# Patient Record
Sex: FEMALE | Race: WHITE | NOT HISPANIC OR LATINO | URBAN - NONMETROPOLITAN AREA
[De-identification: names, ages, dates, MRNs, and addresses within clinical notes are randomized per-mention and may not be internally consistent; named-entity substitution may affect disease eponyms.]

---

## 2023-07-17 ENCOUNTER — TELEPHONE (OUTPATIENT)
Dept: PRIMARY CARE | Facility: CLINIC | Age: 58
End: 2023-07-17

## 2023-07-17 NOTE — TELEPHONE ENCOUNTER
FYI- Patient calling for same day appointment for lump on the inside of vagina. Patient states she does not have an obgyn to see about this matter. Patient doesn't want to wait because she is having some pain.   I apologized that we have no appointments available in the next 48 hours. I encouraged patient should seek evaluation and treatment at the nearest Urgent Care/[facility] or ER depending on symptoms.   Patient aware that a message would be sent to provider to inform them and document conversation.

## 2023-09-26 PROBLEM — E78.1 HYPERTRIGLYCERIDEMIA: Status: ACTIVE | Noted: 2023-09-26

## 2023-09-26 PROBLEM — E78.5 HYPERLIPIDEMIA: Status: ACTIVE | Noted: 2023-09-26

## 2023-09-26 PROBLEM — R00.1 SINUS BRADYCARDIA: Status: ACTIVE | Noted: 2023-09-26

## 2023-09-26 PROBLEM — M67.52 SYNOVIAL PLICA SYNDROME OF LEFT KNEE: Status: ACTIVE | Noted: 2023-09-26

## 2023-09-26 PROBLEM — K44.9 HIATAL HERNIA: Status: ACTIVE | Noted: 2023-09-26

## 2023-09-26 PROBLEM — E06.3 HYPOTHYROIDISM DUE TO HASHIMOTO'S THYROIDITIS: Status: ACTIVE | Noted: 2023-09-26

## 2023-09-26 PROBLEM — E03.8 HYPOTHYROIDISM DUE TO HASHIMOTO'S THYROIDITIS: Status: ACTIVE | Noted: 2023-09-26

## 2023-09-26 RX ORDER — ALBUTEROL SULFATE 90 UG/1
AEROSOL, METERED RESPIRATORY (INHALATION)
COMMUNITY
Start: 2016-03-10 | End: 2024-01-24 | Stop reason: ALTCHOICE

## 2023-09-26 RX ORDER — GUAIFENESIN 600 MG/1
600 TABLET, EXTENDED RELEASE ORAL
COMMUNITY
Start: 2016-03-10 | End: 2024-01-24 | Stop reason: ALTCHOICE

## 2023-09-26 RX ORDER — LEVOTHYROXINE SODIUM 112 UG/1
TABLET ORAL
COMMUNITY
Start: 2019-10-15 | End: 2023-09-27 | Stop reason: SDUPTHER

## 2023-09-26 NOTE — PROGRESS NOTES
"Subjective   Patient ID: Apryl Vinson is a 58 y.o. female who presents for Annual Exam.    HPI     The patient presents for her annual wellness exam.  Last pap/cervical cancer screening: 10/20/22 NILM HPV neg   Last mammogram: 9/27/22 neg ; no family hx   Hx of colonoscopy: 2017   Menopause: 2020  History of depression or anxiety: no   Immunizations: shingrix #2 - declines today - had reaction to first shot   Diet: Follows a healthy diet- lots of fruit, veggies, protein; limiting carbs, less sugary drinks   Exercise: Gets some exercise   Working on weight loss      Hypothyroidism- on levothyroxine 112 mcg daily, ran out of med 1 week ago     HPL- diet controlled     No prior meds      Left posterior neck mass of unknown duration   Her mother and daughter have family hx hodgkin's lymphoma  Her brother had non hodgkin's lymphoma (b-cell)   Feels well, denies night sweats/weight loss     Concern for sinus infection on right side, 2 weeks of nasal congestion, teeth pain, radiates to ear   No fever  Improving some with OTC meds but not gone     Review of Systems   Constitutional:  Negative for chills, fatigue and fever.   HENT:  Negative for congestion, ear pain and sore throat.    Eyes:  Negative for visual disturbance.   Respiratory:  Negative for cough and shortness of breath.    Cardiovascular:  Negative for chest pain, palpitations and leg swelling.   Gastrointestinal:  Negative for abdominal pain, constipation, diarrhea, nausea and vomiting.   Genitourinary: Negative.    Musculoskeletal: Negative.    Skin:  Negative for rash.   Neurological:  Negative for dizziness, weakness, numbness and headaches.   Psychiatric/Behavioral: Negative.         Objective   /71 (BP Location: Left arm, Patient Position: Sitting, BP Cuff Size: Adult)   Pulse 63   Temp 36.6 °C (97.9 °F) (Temporal)   Resp 16   Ht 1.734 m (5' 8.25\")   Wt 105 kg (230 lb 9.6 oz)   LMP  (Exact Date)   SpO2 97%   BMI 34.81 kg/m² "     Physical Exam    Constitutional: Well developed, well nourished, alert and in no acute distress.  Head and Face: Normocephalic, atraumatic.  Eyes: Normal external exam. Pupils equally round and reactive to light with normal accommodation and extraocular movements intact.   ENT: External inspection of ears normal, tympanic membranes visualized and normal. Nasal mucosa, septum, and turbinates normal. Oral mucosa moist, oropharynx clear.   Neck: Supple.  ~2x3 cm left posterior inferior cervical neck mass with difficult to palpate borders, nontender. Thyroid not enlarged, no palpable nodules.   Cardiovascular: Regular rate and rhythm, normal S1 and S2, no murmurs, gallops, or rubs. Radial pulses normal. No peripheral edema. No carotid bruits.   Pulmonary: No respiratory distress, lungs clear to auscultation bilaterally. No wheezes, rhonchi, rales.   Abdomen: Soft, nontender, nondistended, normal bowel sounds. No masses palpated.   Musculoskeletal: Gait normal. Muscle strength/tone normal of all 4 extremities. Normal range of motion of all extremities.   Skin: Warm, well perfused, normal skin turgor and color, no lesions or rashes noted.   Neurologic: Cranial nerves II-XII grossly intact.  Sensation normal bilaterally.   Psychiatric: Mood calm and affect normal.    Assessment/Plan   Problem List Items Addressed This Visit             ICD-10-CM    Hyperlipidemia E78.5     Dietary changes discussed   CT calcium score          Relevant Orders    Lipid Panel    Hypothyroidism due to Hashimoto's thyroiditis E03.8, E06.3     Continue levothyroxine, check TSH          Relevant Medications    levothyroxine (Synthroid, Levoxyl) 112 mcg tablet    Other Relevant Orders    TSH with reflex to Free T4 if abnormal    Class 1 obesity without serious comorbidity with body mass index (BMI) of 34.0 to 34.9 in adult E66.9, Z68.34     Other Visit Diagnoses         Codes    Annual physical exam    -  Primary Z00.00    Relevant Orders     CBC and Auto Differential    Comprehensive Metabolic Panel    Hemoglobin A1C    Screening mammogram, encounter for     Z12.31    Relevant Orders    BI mammo bilateral screening tomosynthesis    Encounter for screening for cardiovascular disorders     Z13.6    Relevant Orders    CT cardiac scoring wo IV contrast    Neck mass     R22.1    Relevant Orders    CT soft tissue neck w IV contrast    Acute non-recurrent maxillary sinusitis     J01.00    Relevant Medications    amoxicillin (Amoxil) 875 mg tablet          Mackenzie Cote, DO.  9/27/2023

## 2023-09-27 ENCOUNTER — OFFICE VISIT (OUTPATIENT)
Dept: PRIMARY CARE | Facility: CLINIC | Age: 58
End: 2023-09-27
Payer: COMMERCIAL

## 2023-09-27 VITALS
RESPIRATION RATE: 16 BRPM | SYSTOLIC BLOOD PRESSURE: 123 MMHG | TEMPERATURE: 97.9 F | HEART RATE: 63 BPM | WEIGHT: 230.6 LBS | OXYGEN SATURATION: 97 % | HEIGHT: 68 IN | DIASTOLIC BLOOD PRESSURE: 71 MMHG | BODY MASS INDEX: 34.95 KG/M2

## 2023-09-27 DIAGNOSIS — E78.5 HYPERLIPIDEMIA, UNSPECIFIED HYPERLIPIDEMIA TYPE: ICD-10-CM

## 2023-09-27 DIAGNOSIS — Z12.31 SCREENING MAMMOGRAM, ENCOUNTER FOR: ICD-10-CM

## 2023-09-27 DIAGNOSIS — E03.8 HYPOTHYROIDISM DUE TO HASHIMOTO'S THYROIDITIS: ICD-10-CM

## 2023-09-27 DIAGNOSIS — E66.9 CLASS 1 OBESITY WITHOUT SERIOUS COMORBIDITY WITH BODY MASS INDEX (BMI) OF 34.0 TO 34.9 IN ADULT, UNSPECIFIED OBESITY TYPE: ICD-10-CM

## 2023-09-27 DIAGNOSIS — E06.3 HYPOTHYROIDISM DUE TO HASHIMOTO'S THYROIDITIS: ICD-10-CM

## 2023-09-27 DIAGNOSIS — R22.1 NECK MASS: ICD-10-CM

## 2023-09-27 DIAGNOSIS — Z13.6 ENCOUNTER FOR SCREENING FOR CARDIOVASCULAR DISORDERS: ICD-10-CM

## 2023-09-27 DIAGNOSIS — Z00.00 ANNUAL PHYSICAL EXAM: Primary | ICD-10-CM

## 2023-09-27 DIAGNOSIS — J01.00 ACUTE NON-RECURRENT MAXILLARY SINUSITIS: ICD-10-CM

## 2023-09-27 PROBLEM — E66.811 CLASS 1 OBESITY WITHOUT SERIOUS COMORBIDITY WITH BODY MASS INDEX (BMI) OF 34.0 TO 34.9 IN ADULT: Status: ACTIVE | Noted: 2023-09-27

## 2023-09-27 PROCEDURE — 3008F BODY MASS INDEX DOCD: CPT | Performed by: FAMILY MEDICINE

## 2023-09-27 PROCEDURE — 1036F TOBACCO NON-USER: CPT | Performed by: FAMILY MEDICINE

## 2023-09-27 PROCEDURE — 99396 PREV VISIT EST AGE 40-64: CPT | Performed by: FAMILY MEDICINE

## 2023-09-27 RX ORDER — AMOXICILLIN 875 MG/1
875 TABLET, FILM COATED ORAL 2 TIMES DAILY
Qty: 14 TABLET | Refills: 0 | Status: SHIPPED | OUTPATIENT
Start: 2023-09-27 | End: 2023-10-04

## 2023-09-27 RX ORDER — LEVOTHYROXINE SODIUM 112 UG/1
112 TABLET ORAL
Qty: 90 TABLET | Refills: 3 | Status: SHIPPED | OUTPATIENT
Start: 2023-09-27 | End: 2024-09-26

## 2023-09-27 ASSESSMENT — ENCOUNTER SYMPTOMS
ABDOMINAL PAIN: 0
DIZZINESS: 0
DIARRHEA: 0
MUSCULOSKELETAL NEGATIVE: 1
FATIGUE: 0
WEAKNESS: 0
PALPITATIONS: 0
NUMBNESS: 0
SHORTNESS OF BREATH: 0
CONSTIPATION: 0
PSYCHIATRIC NEGATIVE: 1
CHILLS: 0
VOMITING: 0
HEADACHES: 0
SORE THROAT: 0
NAUSEA: 0
COUGH: 0
FEVER: 0

## 2023-09-27 ASSESSMENT — PATIENT HEALTH QUESTIONNAIRE - PHQ9
1. LITTLE INTEREST OR PLEASURE IN DOING THINGS: NOT AT ALL
SUM OF ALL RESPONSES TO PHQ9 QUESTIONS 1 AND 2: 0
2. FEELING DOWN, DEPRESSED OR HOPELESS: NOT AT ALL

## 2023-09-27 NOTE — PATIENT INSTRUCTIONS
Recommendations for women annual wellness exam:   Make sure screenings for cervical, breast, and colon cancer are up to date if applicable- pap smears age 21-65, discuss mammogram starting at age 40, colonoscopy at age 45, earlier if positive family history for breast or colon cancer   Screen for osteoporosis with DXA bone scan starting at age 65 or sooner if risk factors present (long term steroid use, smoking, heavy alcohol use, history of fracture, rheumatoid arthritis, low body weight, family history of hip fracture)  Screening for lung cancer with low-dose CT in all adults age 50-80 who have a 20 pack-year smoking history and currently smoke or have quit within the past 15 years  Follow a healthy diet (Dash diet, Mediterranean diet)  Exercise 150 min/wk   Maintain healthy weight (BMI < 25)   Do not smoke   Alcohol in moderation (up to 1 drink/day)  Get enough sleep (7-8 hours/night)  Make sure immunizations are up to date (influenza, Tdap, shingles if age > 50)  Postmenopausal women or women with osteoporosis need minimum 1,200 mg calcium and 800 IU vitamin D daily  Talk to your physician if you have concerns about depression or anxiety  Visit dentist twice yearly

## 2023-10-12 ENCOUNTER — LAB (OUTPATIENT)
Dept: LAB | Facility: LAB | Age: 58
End: 2023-10-12
Payer: COMMERCIAL

## 2023-10-12 DIAGNOSIS — Z00.00 ANNUAL PHYSICAL EXAM: ICD-10-CM

## 2023-10-12 DIAGNOSIS — E78.5 HYPERLIPIDEMIA, UNSPECIFIED HYPERLIPIDEMIA TYPE: ICD-10-CM

## 2023-10-12 DIAGNOSIS — E06.3 HYPOTHYROIDISM DUE TO HASHIMOTO'S THYROIDITIS: ICD-10-CM

## 2023-10-12 DIAGNOSIS — E03.8 HYPOTHYROIDISM DUE TO HASHIMOTO'S THYROIDITIS: ICD-10-CM

## 2023-10-12 LAB
ALBUMIN SERPL BCP-MCNC: 4.7 G/DL (ref 3.4–5)
ALP SERPL-CCNC: 95 U/L (ref 33–110)
ALT SERPL W P-5'-P-CCNC: 14 U/L (ref 7–45)
ANION GAP SERPL CALC-SCNC: 12 MMOL/L (ref 10–20)
AST SERPL W P-5'-P-CCNC: 15 U/L (ref 9–39)
BASOPHILS # BLD AUTO: 0.06 X10*3/UL (ref 0–0.1)
BASOPHILS NFR BLD AUTO: 0.9 %
BILIRUB SERPL-MCNC: 0.4 MG/DL (ref 0–1.2)
BUN SERPL-MCNC: 16 MG/DL (ref 6–23)
CALCIUM SERPL-MCNC: 10.3 MG/DL (ref 8.6–10.6)
CHLORIDE SERPL-SCNC: 103 MMOL/L (ref 98–107)
CHOLEST SERPL-MCNC: 208 MG/DL (ref 0–199)
CHOLESTEROL/HDL RATIO: 3.4
CO2 SERPL-SCNC: 30 MMOL/L (ref 21–32)
CREAT SERPL-MCNC: 0.75 MG/DL (ref 0.5–1.05)
EOSINOPHIL # BLD AUTO: 0.39 X10*3/UL (ref 0–0.7)
EOSINOPHIL NFR BLD AUTO: 5.8 %
ERYTHROCYTE [DISTWIDTH] IN BLOOD BY AUTOMATED COUNT: 13.3 % (ref 11.5–14.5)
EST. AVERAGE GLUCOSE BLD GHB EST-MCNC: 114 MG/DL
GFR SERPL CREATININE-BSD FRML MDRD: >90 ML/MIN/1.73M*2
GLUCOSE SERPL-MCNC: 88 MG/DL (ref 74–99)
HBA1C MFR BLD: 5.6 %
HCT VFR BLD AUTO: 43.5 % (ref 36–46)
HDLC SERPL-MCNC: 61.2 MG/DL
HGB BLD-MCNC: 14 G/DL (ref 12–16)
IMM GRANULOCYTES # BLD AUTO: 0.02 X10*3/UL (ref 0–0.7)
IMM GRANULOCYTES NFR BLD AUTO: 0.3 % (ref 0–0.9)
LDLC SERPL CALC-MCNC: 113 MG/DL
LYMPHOCYTES # BLD AUTO: 2.26 X10*3/UL (ref 1.2–4.8)
LYMPHOCYTES NFR BLD AUTO: 33.9 %
MCH RBC QN AUTO: 28.9 PG (ref 26–34)
MCHC RBC AUTO-ENTMCNC: 32.2 G/DL (ref 32–36)
MCV RBC AUTO: 90 FL (ref 80–100)
MONOCYTES # BLD AUTO: 0.62 X10*3/UL (ref 0.1–1)
MONOCYTES NFR BLD AUTO: 9.3 %
NEUTROPHILS # BLD AUTO: 3.32 X10*3/UL (ref 1.2–7.7)
NEUTROPHILS NFR BLD AUTO: 49.8 %
NON HDL CHOLESTEROL: 147 MG/DL (ref 0–149)
NRBC BLD-RTO: 0 /100 WBCS (ref 0–0)
PLATELET # BLD AUTO: 463 X10*3/UL (ref 150–450)
PMV BLD AUTO: 9 FL (ref 7.5–11.5)
POTASSIUM SERPL-SCNC: 4.8 MMOL/L (ref 3.5–5.3)
PROT SERPL-MCNC: 8 G/DL (ref 6.4–8.2)
RBC # BLD AUTO: 4.85 X10*6/UL (ref 4–5.2)
SODIUM SERPL-SCNC: 140 MMOL/L (ref 136–145)
TRIGL SERPL-MCNC: 168 MG/DL (ref 0–149)
TSH SERPL-ACNC: 3.2 MIU/L (ref 0.44–3.98)
VLDL: 34 MG/DL (ref 0–40)
WBC # BLD AUTO: 6.7 X10*3/UL (ref 4.4–11.3)

## 2023-10-12 PROCEDURE — 84443 ASSAY THYROID STIM HORMONE: CPT

## 2023-10-12 PROCEDURE — 83036 HEMOGLOBIN GLYCOSYLATED A1C: CPT

## 2023-10-12 PROCEDURE — 80053 COMPREHEN METABOLIC PANEL: CPT

## 2023-10-12 PROCEDURE — 80061 LIPID PANEL: CPT

## 2023-10-12 PROCEDURE — 85025 COMPLETE CBC W/AUTO DIFF WBC: CPT

## 2023-10-12 PROCEDURE — 36415 COLL VENOUS BLD VENIPUNCTURE: CPT

## 2023-10-19 ENCOUNTER — HOSPITAL ENCOUNTER (OUTPATIENT)
Dept: RADIOLOGY | Facility: CLINIC | Age: 58
Discharge: HOME | End: 2023-10-19
Payer: COMMERCIAL

## 2023-10-19 ENCOUNTER — TELEPHONE (OUTPATIENT)
Dept: PRIMARY CARE | Facility: CLINIC | Age: 58
End: 2023-10-19

## 2023-10-19 ENCOUNTER — APPOINTMENT (OUTPATIENT)
Dept: RADIOLOGY | Facility: CLINIC | Age: 58
End: 2023-10-19
Payer: COMMERCIAL

## 2023-10-19 DIAGNOSIS — Z12.31 SCREENING MAMMOGRAM, ENCOUNTER FOR: ICD-10-CM

## 2023-10-19 PROCEDURE — 77063 BREAST TOMOSYNTHESIS BI: CPT

## 2023-10-19 PROCEDURE — 77067 SCR MAMMO BI INCL CAD: CPT | Mod: BILATERAL PROCEDURE | Performed by: RADIOLOGY

## 2023-10-19 PROCEDURE — 77063 BREAST TOMOSYNTHESIS BI: CPT | Mod: BILATERAL PROCEDURE | Performed by: RADIOLOGY

## 2023-10-19 NOTE — TELEPHONE ENCOUNTER
Patient calling regarding CT head/neck    Insurance has not yet approved this, she was scheduled for today but they cancelled due to this    She was under the impression this was going to be ordered as STAT    She wants to know if there is anything our office can do?  It is now worse and on the other side as well at this point

## 2023-10-19 NOTE — TELEPHONE ENCOUNTER
Patient is aware FYI.  Per patient, they admitted this was never even started until today but it was approved.

## 2023-10-19 NOTE — TELEPHONE ENCOUNTER
Per [facility] de[facility], patient is scheduled 10/26.  They are hoping this will be authorized by then.  If it is approved before then, they will schedule patient sooner.

## 2023-10-26 ENCOUNTER — ANCILLARY PROCEDURE (OUTPATIENT)
Dept: RADIOLOGY | Facility: CLINIC | Age: 58
End: 2023-10-26
Payer: COMMERCIAL

## 2023-10-26 DIAGNOSIS — J34.1 MUCOCELE OF MAXILLARY SINUS: Primary | ICD-10-CM

## 2023-10-26 DIAGNOSIS — R22.1 NECK MASS: ICD-10-CM

## 2023-10-26 PROBLEM — M47.812 CERVICAL SPONDYLOSIS: Status: ACTIVE | Noted: 2023-10-26

## 2023-10-26 PROCEDURE — 70491 CT SOFT TISSUE NECK W/DYE: CPT | Performed by: RADIOLOGY

## 2023-10-26 PROCEDURE — 70491 CT SOFT TISSUE NECK W/DYE: CPT

## 2023-10-26 PROCEDURE — 2550000001 HC RX 255 CONTRASTS: Performed by: FAMILY MEDICINE

## 2023-10-26 RX ADMIN — IOHEXOL 75 ML: 350 INJECTION, SOLUTION INTRAVENOUS at 09:37

## 2023-12-11 ENCOUNTER — APPOINTMENT (OUTPATIENT)
Dept: OTOLARYNGOLOGY | Facility: CLINIC | Age: 58
End: 2023-12-11
Payer: COMMERCIAL

## 2023-12-29 ENCOUNTER — ANCILLARY PROCEDURE (OUTPATIENT)
Dept: RADIOLOGY | Facility: CLINIC | Age: 58
End: 2023-12-29
Payer: COMMERCIAL

## 2023-12-29 DIAGNOSIS — Z13.6 ENCOUNTER FOR SCREENING FOR CARDIOVASCULAR DISORDERS: ICD-10-CM

## 2023-12-29 PROCEDURE — 75571 CT HRT W/O DYE W/CA TEST: CPT

## 2024-01-03 DIAGNOSIS — R93.89 ABNORMAL CT OF THE CHEST: Primary | ICD-10-CM

## 2024-01-04 PROBLEM — M54.2 NECK PAIN: Status: ACTIVE | Noted: 2024-01-04

## 2024-01-04 PROBLEM — D75.839 THROMBOCYTHEMIA: Status: ACTIVE | Noted: 2024-01-04

## 2024-01-04 PROBLEM — M75.81 TENDINITIS OF RIGHT ROTATOR CUFF: Status: ACTIVE | Noted: 2024-01-04

## 2024-01-04 PROBLEM — E66.9 OBESITY WITH BODY MASS INDEX 30 OR GREATER: Status: ACTIVE | Noted: 2024-01-04

## 2024-01-04 PROBLEM — R22.1 MASS OF NECK: Status: ACTIVE | Noted: 2023-10-26

## 2024-01-04 PROBLEM — J01.00 ACUTE MAXILLARY SINUSITIS: Status: ACTIVE | Noted: 2024-01-04

## 2024-01-04 PROBLEM — D50.9 IRON DEFICIENCY ANEMIA: Status: ACTIVE | Noted: 2024-01-04

## 2024-01-04 PROBLEM — R06.02 SHORTNESS OF BREATH: Status: ACTIVE | Noted: 2024-01-04

## 2024-01-04 PROBLEM — M47.812 SPONDYLOSIS OF CERVICAL SPINE: Status: ACTIVE | Noted: 2023-10-26

## 2024-01-04 PROBLEM — M67.919 DISORDER OF ROTATOR CUFF: Status: ACTIVE | Noted: 2024-01-04

## 2024-01-04 PROBLEM — E66.9 OBESITY: Status: ACTIVE | Noted: 2023-09-27

## 2024-01-04 PROBLEM — N92.0 MENORRHAGIA: Status: ACTIVE | Noted: 2024-01-04

## 2024-01-04 PROBLEM — E66.3 OVERWEIGHT: Status: ACTIVE | Noted: 2024-01-04

## 2024-01-04 PROBLEM — M67.50 SYNOVIAL PLICA SYNDROME OF KNEE: Status: ACTIVE | Noted: 2024-01-04

## 2024-01-04 RX ORDER — LANOLIN ALCOHOL/MO/W.PET/CERES
1000 CREAM (GRAM) TOPICAL
COMMUNITY
Start: 2017-10-12

## 2024-01-04 RX ORDER — ACETAMINOPHEN 500 MG
2000 TABLET ORAL DAILY
COMMUNITY

## 2024-01-04 RX ORDER — IBUPROFEN 200 MG
200 TABLET ORAL 4 TIMES DAILY
COMMUNITY

## 2024-01-04 RX ORDER — FERROUS SULFATE 325(65) MG
325 TABLET ORAL
COMMUNITY

## 2024-01-04 RX ORDER — DICLOFENAC SODIUM 10 MG/G
4 GEL TOPICAL 2 TIMES DAILY PRN
COMMUNITY
Start: 2021-03-08

## 2024-01-11 ENCOUNTER — TELEPHONE (OUTPATIENT)
Dept: PRIMARY CARE | Facility: CLINIC | Age: 59
End: 2024-01-11
Payer: COMMERCIAL

## 2024-01-11 DIAGNOSIS — R82.998 FOAMY URINE: Primary | ICD-10-CM

## 2024-01-11 NOTE — TELEPHONE ENCOUNTER
Patient has noticed the past few times she has used the restroom that there are bubbles/foam in her urine     Denies strong odor, burning, frequency    She wants to know if she should be concerned about this or seen in office

## 2024-01-12 ENCOUNTER — LAB (OUTPATIENT)
Dept: LAB | Facility: LAB | Age: 59
End: 2024-01-12
Payer: COMMERCIAL

## 2024-01-12 DIAGNOSIS — R82.998 FOAMY URINE: ICD-10-CM

## 2024-01-12 PROCEDURE — 87086 URINE CULTURE/COLONY COUNT: CPT

## 2024-01-12 PROCEDURE — 81001 URINALYSIS AUTO W/SCOPE: CPT

## 2024-01-13 LAB
APPEARANCE UR: ABNORMAL
BILIRUB UR STRIP.AUTO-MCNC: NEGATIVE MG/DL
COLOR UR: YELLOW
GLUCOSE UR STRIP.AUTO-MCNC: NEGATIVE MG/DL
KETONES UR STRIP.AUTO-MCNC: NEGATIVE MG/DL
LEUKOCYTE ESTERASE UR QL STRIP.AUTO: ABNORMAL
MUCOUS THREADS #/AREA URNS AUTO: NORMAL /LPF
NITRITE UR QL STRIP.AUTO: NEGATIVE
PH UR STRIP.AUTO: 5 [PH]
PROT UR STRIP.AUTO-MCNC: NEGATIVE MG/DL
RBC # UR STRIP.AUTO: ABNORMAL /UL
RBC #/AREA URNS AUTO: NORMAL /HPF
SP GR UR STRIP.AUTO: 1.01
SQUAMOUS #/AREA URNS AUTO: NORMAL /HPF
UROBILINOGEN UR STRIP.AUTO-MCNC: <2 MG/DL
WBC #/AREA URNS AUTO: NORMAL /HPF

## 2024-01-14 LAB — BACTERIA UR CULT: NORMAL

## 2024-01-22 DIAGNOSIS — N30.00 ACUTE CYSTITIS WITHOUT HEMATURIA: Primary | ICD-10-CM

## 2024-01-22 RX ORDER — NITROFURANTOIN 25; 75 MG/1; MG/1
100 CAPSULE ORAL 2 TIMES DAILY
Qty: 10 CAPSULE | Refills: 0 | Status: SHIPPED | OUTPATIENT
Start: 2024-01-22 | End: 2024-01-27

## 2024-01-24 ENCOUNTER — OFFICE VISIT (OUTPATIENT)
Dept: OTOLARYNGOLOGY | Facility: CLINIC | Age: 59
End: 2024-01-24
Payer: COMMERCIAL

## 2024-01-24 VITALS — BODY MASS INDEX: 33.34 KG/M2 | WEIGHT: 220 LBS | HEIGHT: 68 IN

## 2024-01-24 DIAGNOSIS — J34.3 HYPERTROPHY OF BOTH INFERIOR NASAL TURBINATES: ICD-10-CM

## 2024-01-24 DIAGNOSIS — R09.81 NASAL CONGESTION: ICD-10-CM

## 2024-01-24 DIAGNOSIS — J32.9 CHRONIC RHINOSINUSITIS: Primary | ICD-10-CM

## 2024-01-24 DIAGNOSIS — J34.89 NASAL DRAINAGE: ICD-10-CM

## 2024-01-24 DIAGNOSIS — J34.89 NASAL OBSTRUCTION: ICD-10-CM

## 2024-01-24 DIAGNOSIS — J34.1 MUCOCELE OF MAXILLARY SINUS: ICD-10-CM

## 2024-01-24 DIAGNOSIS — R44.8 FACIAL PRESSURE: ICD-10-CM

## 2024-01-24 PROCEDURE — 99204 OFFICE O/P NEW MOD 45 MIN: CPT | Performed by: STUDENT IN AN ORGANIZED HEALTH CARE EDUCATION/TRAINING PROGRAM

## 2024-01-24 PROCEDURE — 31231 NASAL ENDOSCOPY DX: CPT | Performed by: STUDENT IN AN ORGANIZED HEALTH CARE EDUCATION/TRAINING PROGRAM

## 2024-01-24 PROCEDURE — 1036F TOBACCO NON-USER: CPT | Performed by: STUDENT IN AN ORGANIZED HEALTH CARE EDUCATION/TRAINING PROGRAM

## 2024-01-24 PROCEDURE — 3008F BODY MASS INDEX DOCD: CPT | Performed by: STUDENT IN AN ORGANIZED HEALTH CARE EDUCATION/TRAINING PROGRAM

## 2024-01-24 RX ORDER — SOD CHLOR,BICARB/SQUEEZ BOTTLE
PACKET, WITH RINSE DEVICE NASAL
Qty: 1 EACH | Refills: 3 | Status: SHIPPED | OUTPATIENT
Start: 2024-01-24

## 2024-01-24 RX ORDER — AMOXICILLIN AND CLAVULANATE POTASSIUM 875; 125 MG/1; MG/1
875 TABLET, FILM COATED ORAL 2 TIMES DAILY
Qty: 28 TABLET | Refills: 0 | Status: SHIPPED | OUTPATIENT
Start: 2024-01-24 | End: 2024-02-07

## 2024-01-24 RX ORDER — PREDNISONE 10 MG/1
TABLET ORAL
Qty: 19 TABLET | Refills: 0 | Status: SHIPPED | OUTPATIENT
Start: 2024-01-24 | End: 2024-05-14 | Stop reason: WASHOUT

## 2024-01-24 RX ORDER — FLUTICASONE PROPIONATE 50 MCG
1 SPRAY, SUSPENSION (ML) NASAL 2 TIMES DAILY
Qty: 48 G | Refills: 3 | Status: SHIPPED | OUTPATIENT
Start: 2024-01-24 | End: 2025-01-23

## 2024-01-24 NOTE — PROGRESS NOTES
HPI  58-year-old female presenting for evaluation of multiple sinonasal complaints.  Main Symptoms: Daily sinonasal symptoms include bilateral nasal congestion/obstruction, anterior and posterior nasal drainage, facial pressure along the right maxillary and frontal region  Duration: Years  Laterality: R>L  Recently underwent CT neck for evaluation of left neck lipoma which incidentally showed complete right maxillary sinus opacification.     Patient denies facial pain, facial numbness/weakness, ansomia, dental pain, immunotherapy.    No odynophagia/dysphagia/SOB/dyspnea/hoarseness/fevers/chills/weight loss/night sweats.    Current treatment:  Nasal Steroid: No (has used Flonase)  Sinus Rinse: No  Antihistamine: No  Prednisone: No  Other: None    Recent CT sinus: None    Previous nasal/sinus surgery: None     Past Medical History:   Diagnosis Date    Benign lipomatous neoplasm of skin and subcutaneous tissue of trunk 11/28/2017    Lipoma of back    Encounter for screening for malignant neoplasm of cervix 07/02/2019    Screening for cervical cancer    History of colonoscopy 11/21/2017 11/21/17    History of mammogram 10/23/2023    10/23/23 neg    Hypertrophy of uterus 10/14/2019    Enlarged uterus    Other bursitis of knee, unspecified knee 04/04/2019    Pes anserine bursitis    Other chest pain 07/06/2020    Atypical chest pain    Other shoulder lesions, right shoulder 04/05/2021    Tendinitis of right rotator cuff    Pain in left knee 04/04/2019    Left knee pain    Pain in right shoulder 05/26/2021    Pain in right shoulder    Personal history of diseases of the blood and blood-forming organs and certain disorders involving the immune mechanism 10/14/2019    History of thrombocytosis    Personal history of diseases of the blood and blood-forming organs and certain disorders involving the immune mechanism 06/16/2020    History of iron deficiency anemia    Personal history of other diseases of the female genital  "tract 06/16/2020    History of menorrhagia    Personal history of other diseases of the respiratory system     History of asthma    Screening for cervical cancer 10/20/2022    10/20/22 NILM HPV neg    Unspecified rotator cuff tear or rupture of right shoulder, not specified as traumatic 04/06/2021    Rotator cuff tear, right    Urinary tract infection, site not specified 12/22/2020    Acute UTI        Past Surgical History:   Procedure Laterality Date    COLONOSCOPY  11/21/2017    Complete Colonoscopy    OTHER SURGICAL HISTORY  05/04/2016    Open Treat Tib Shaft & Fibular Fracture With Plate / Screws    OTHER SURGICAL HISTORY  10/11/2018    Surgery Excision Lipoma    OTHER SURGICAL HISTORY  04/22/2020    Biopsy Endometrial, Without Cervical Dilation        Current Outpatient Medications   Medication Instructions    cholecalciferol (VITAMIN D-3) 2,000 Units, oral, Daily    cyanocobalamin (VITAMIN B-12) 1,000 mcg, oral, Daily RT    diclofenac sodium (VOLTAREN) 4 g, Topical, 2 times daily PRN    ferrous sulfate (325 mg ferrous sulfate) 325 mg, oral, Daily with breakfast    ibuprofen 200 mg, oral, 4 times daily    levothyroxine (SYNTHROID, LEVOXYL) 112 mcg, oral, Daily before breakfast    nitrofurantoin, macrocrystal-monohydrate, (Macrobid) 100 mg capsule 100 mg, oral, 2 times daily        Allergies   Allergen Reactions    Codeine Other and Nausea Only     \"I feel like I'm in a fog\"        Review of Systems  A detailed 12 point ROS was performed and is negative except as noted in the intake form, HPI and/or Past Medical History    Physical Exam   CONSTITUTIONAL: Well developed, well nourished.  VOICE: Normal voice quality  RESPIRATION: Breathing comfortably, no stridor.  CV: No clubbing/cyanosis/edema in hands.  EYES: EOM Intact, sclera normal.  NEURO: Alert and oriented times 3, Cranial nerves V,VII intact and symmetric bilaterally.  HEAD AND FACE: Symmetric facial features, no masses or lesions, sinuses nontender to " palpation.  SALIVARY GLANDS: Parotid and submandibular glands normal bilaterally.  EARS: Normal external ears, external auditory canals, and TMs to otoscopy  + NOSE: External nose midline, anterior rhinoscopy is normal with limited visualization to the anterior aspect of the interior turbinates. No lesions noted.  Bilateral inferior turbinate hypertrophy  Small right caudal septal spur  ORAL CAVITY/OROPHARYNX/LIPS: Normal mucous membranes, normal floor of mouth/tongue/OP, no masses or lesions are noted.  PHARYNGEAL WALLS AND NASOPHARYNX: No masses noted. Mucosa appears clean and moist  + NECK/LYMPH: No LAD, no thyroid masses. Trachea palpably midline  Fullness along the posterior border of left SCM  (Lipoma per CT neck)  SKIN: Neck skin is without injury  PSYCH: Alert and oriented with appropriate mood and affect     Nasal endoscopy:  PROCEDURE NOTE    For better visualization because of septal deviation, turbinate hypertrophy nasal endoscopy was performed after verbal consent was obtained by the patient and/or guardian. Both nostrils were sprayed with a mixture of lidocaine 4% and Afrin. After a sufficient amount of time elapsed for mucosal anesthesia to take place, the nasal endoscope was advanced into the nostril.    The following areas were visualized:  Nasal passage, nasal septum, turbinates, middle meatus, nasopharynx, sinus ostia    The patient tolerated the procedure well and these structures were found to be normal except as follows:  Bilateral inferior turbinate hypertrophy  Small right caudal septal spur otherwise mostly midline septum  Generalized mucosal edema noted along the right middle meatus  No mucopurulence, polyps, nor masses seen in inferior meati, middle meati, nor sphenoethmoidal recesses bilaterally.     Assessment  Chronic rhinosinusitis  Right maxillary sinusitis, possible mucocele vs mycetoma  Facial pressure  Nasal airway obstruction  Nasal septal deviation  Nasal drainage  Bilateral  inferior turbinate hypertrophy      Plan  The patient and I discussed their current nasal / sinus symptoms as well as several therapeutic options. I would like to begin a nasal hygiene/ medical regimen consisting of Flonase, saline irrigations.  Augmentin and prednisone course prescribed (risks/potential adverse effect discussed) after which we will proceed with dedicated CT sinus to further evaluate the region.    The patient was instructed on the correct technique to administer the topical nasal spray (s) aiming at the lateral nasal wall for maximal efficacy and to avoid irritation to the septum which could lead to epistaxis. I stressed consistency of usage for maximal benefit. We discussed the rationale for the timing of this therapy and the benefits and potential adverse effects.      RTC 2m

## 2024-03-06 ENCOUNTER — APPOINTMENT (OUTPATIENT)
Dept: RADIOLOGY | Facility: CLINIC | Age: 59
End: 2024-03-06
Payer: COMMERCIAL

## 2024-03-11 ENCOUNTER — HOSPITAL ENCOUNTER (OUTPATIENT)
Dept: RADIOLOGY | Facility: CLINIC | Age: 59
Discharge: HOME | End: 2024-03-11
Payer: COMMERCIAL

## 2024-03-11 DIAGNOSIS — J32.9 CHRONIC RHINOSINUSITIS: ICD-10-CM

## 2024-03-11 PROCEDURE — 70486 CT MAXILLOFACIAL W/O DYE: CPT | Performed by: RADIOLOGY

## 2024-03-11 PROCEDURE — 70486 CT MAXILLOFACIAL W/O DYE: CPT

## 2024-03-13 ENCOUNTER — OFFICE VISIT (OUTPATIENT)
Dept: OTOLARYNGOLOGY | Facility: CLINIC | Age: 59
End: 2024-03-13
Payer: COMMERCIAL

## 2024-03-13 VITALS — HEIGHT: 67 IN | WEIGHT: 225 LBS | BODY MASS INDEX: 35.31 KG/M2

## 2024-03-13 DIAGNOSIS — R09.81 NASAL CONGESTION: ICD-10-CM

## 2024-03-13 DIAGNOSIS — J32.9 CHRONIC RHINOSINUSITIS: Primary | ICD-10-CM

## 2024-03-13 DIAGNOSIS — J34.3 HYPERTROPHY OF BOTH INFERIOR NASAL TURBINATES: ICD-10-CM

## 2024-03-13 DIAGNOSIS — R44.8 FACIAL PRESSURE: ICD-10-CM

## 2024-03-13 DIAGNOSIS — J34.89 NASAL OBSTRUCTION: ICD-10-CM

## 2024-03-13 DIAGNOSIS — J34.89 NASAL DRAINAGE: ICD-10-CM

## 2024-03-13 PROCEDURE — 1036F TOBACCO NON-USER: CPT | Performed by: STUDENT IN AN ORGANIZED HEALTH CARE EDUCATION/TRAINING PROGRAM

## 2024-03-13 PROCEDURE — 3008F BODY MASS INDEX DOCD: CPT | Performed by: STUDENT IN AN ORGANIZED HEALTH CARE EDUCATION/TRAINING PROGRAM

## 2024-03-13 PROCEDURE — 99214 OFFICE O/P EST MOD 30 MIN: CPT | Performed by: STUDENT IN AN ORGANIZED HEALTH CARE EDUCATION/TRAINING PROGRAM

## 2024-03-13 NOTE — PATIENT INSTRUCTIONS
What can I expect before and after surgery?     1) Before surgery   In preparation for your surgery, your physician may prescribe a preoperative regimen of medications in order to optimize the condition of your sinuses prior to surgery. The medications may include oral steroids (prednisone) that you start taking 7 days before surgery (don't take it on the day of surgery on) restart on postoperative day 1. Take the antibiotics AFTER the surgery. If any preoperative medications are deemed necessary by your physician, please be sure to start the medications on the day directed and adhere closely to the prescription. In addition, you should avoid taking aspirin and aspirin related products for at least 14 days prior to surgery. Aspirin, ibuprofen (Motrin/Advil), naproxen (Aleve), Excedrin (contains aspirin) and related products can thin the blood and create excessive bleeding both during the surgery and in the postoperative period. Tylenol is acceptable and may be taken any time up to the day of surgery.     2) After surgery   At your first postoperative visit any packs that may have been placed will be removed and your nose will be cleaned of clotted blood and debris. This is an uncomfortable procedure, so we recommend that you take a pain pill approximately one hour prior to your appointment, even if you have not been experiencing much postoperative pain up to that day. You will continue to have additional postoperative visits at an interval determined by your surgeon, until your sinuses have completely healed and are doing well. Sinus surgery does NOT eliminate the underlying causes that gave you sinusitis in the first place, so it is important that you continue your medical therapies (nasal spray, irrigation etc) even after surgery.     How do I prepare for surgery?   If your age and/or medical history indicate it, your physician may order testing prior to surgery such as blood tests, EKG, or chest x-ray. If these  have been ordered, please make sure they are completed before the day of surgery.   If you have a significant increase in your sinus infection in the week(s) prior to surgery, notify us. Your surgery may need to be postponed.   Make sure you have a  to take you home after surgery. You will NOT be allowed to leave the hospital by yourself or to drive yourself home.     DO NOT take aspirin or salicylate containing pain medication for at least ten days prior to surgery. Aspirin, even in small quantities, can significantly increase bleeding during surgery and postoperatively.     DO NOT take non-steroidal anti-inflammatory drugs (Ibuprofen, Advil, Motrin, Aleve, Naproxen) for at least five days prior to surgery. These drugs will also increase bleeding, although the effects on the blood are shorter. You may use Tylenol for pain control during this time if needed. Any other medications which thin the blood (such as Coumadin or Plavix) will need to be stopped prior to surgery. Your surgeon will discuss the timing of when to stop coumadin and whether another short acting medication such as Lovenox will be needed to use in place of the Coumadin while you are off it.     DO NOT smoke if at all possible for at least three weeks prior to surgery. Not only does smoking worsen sinus symptoms, smoking in the weeks before or after surgery will result in excessive scarring, and may result in failure of the operation.     DO NOT eat or drink anything beginning at midnight the night before surgery unless otherwise instructed by our colleagues in anesthesia. In most cases your normal morning medications should be taken with a small sip of water on the morning of surgery - your surgeon will let you know if there any exceptions to this.     NOTIFY YOUR SURGEON if you have a prosthesis or heart valve disorder that requires antibiotics at the time of surgery or if you have had prior difficulty with general anesthesia.     What will  happen during surgery?   The surgery is typically not uncomfortable and should not be an unpleasant experience. Depending on the several factors, the operation may be performed under general anesthesia or under local anesthesia with an anesthesiologist providing monitored sedation.     Regardless of anesthetic method chosen, in the preoperative area an intravenous line will be started to administer fluid and you will be given medicines in your IV to help you relax.   Once in the operating room, if local anesthesia is chosen, you will be given additional IV medication to keep you sleepy and relaxed. Medicated pieces of cotton will be placed in your nose and several injections of local anesthetic will be administered inside your nose. The anesthesiologist will make you extra sleepy during this process. Should you experience significant discomfort during the procedure, we will provide additional monitored sedation.     If you are receiving general anesthesia, a temporary tube is inserted into you mouth/throat after you are completely asleep to maintain your breathing. If the planned surgery is for more than 3 hours duration, a urinary catheter may also be placed while you are asleep. It is usually removed before you leave the recovery room. It is rare that a catheter is necessary. When the surgery is completed, the breathing tube is removed from your airway before you regain consciousness. Patients occasionally report sore throat and/or nausea postoperatively as a result of the breathing tube and anesthetic.     What can I expect following my surgery?   Some bloody postnasal discharge may occur for approximately two weeks after this procedure. This is normal and slowly improves. You SHOULD NOT blow your nose for at least seven days following surgery. Since nasal packing is usually not used, you will have some bleeding from your nose and you may go through more than one box of tissues by spotting them with blood during  the first 24-48 hours after surgery. As the sinuses begin to clear themselves, you can expect to have some thick brown drainage from your nose. This is mucus and old blood and does not indicate an infection. You may experience some discomfort postoperatively due to manipulation and inflammation. Take your pain medication as directed. Often extra-strength Tylenol® is sufficient.     On the the first day after surgery you should begin irrigating six times a day with saline using the [facility] Sinus Rinse bottle. If you have had packing placed in your nose, you will not be able to do these things until the packing is removed. Your surgeon will inform you if packing has been placed.   The first follow-up visit is usually arranged at approximately one week after surgery to clean clot and crusts from the nose. This visit is usually uncomfortable and we recommend that you take a dose of your pain medication about one hour prior to the visit. If you are using narcotic pain medicine, you will need a . Consent to the surgery also includes consent to this postoperative care.     Careful postoperative care by both you and your surgeon is essential to the success of the surgery. It is very important that you follow these instructions, as well as any additional instructions given by us, to promote healing and decrease the chance of complications from the surgery.     SOME IMPORTANT POSTOPERATIVE DO´S AND DON´TS AFTER SURGERY     ° DO NOT blow your nose until you have been given permission to do so (usually one week following surgery).     ° DO NOT bend, lift or strain for at lease one week after surgery. These activities will promote bleeding from your nose. You should not plan on participating in any rigorous activity until healing is completed.     ° DO NOT suppress the need to cough or sneeze, but cough/sneeze with your mouth open.     ° DO NOT resume use of any aspirin-containing products or other blood thinners until  after discussing this with us. Typically, you can restart after 7-10 days.     ° DO NOT overuse Tylenol (acetaminophen). Extra-strength Tylenol can be used for pain but can be harmful to your liver if used overdosed. Your prescription narcotic pain medicine also contains Tylenol, so it is important that you don´t use both at the same time. Do not exceed 2000 mg of Tylenol in 24 hours.     ° DO use nasal saline spray (without decongestant) every hour while you are awake beginning the day after surgery. This helps moisten your nose and prevents large crusts from forming.    ° DO use nasal saline irrigation at least six times daily beginning on day after surgery     ° DO take your antibiotics (if they have been prescribed for you). Mild diarrhea from antibiotic usage is common. This can often be prevented by taking acidophilus daily, which is found in yogurt with active cultures or as tablets in a health food store. If you should experience severe persistent diarrhea, this may be indicative of another health problem. In this case stop the antibiotic and notify us. Further evaluation may be required.     ° DO notify us for any of the following: temperature elevations above 100.5 F, clear watery drainage from your nose, changes in vision, swelling of the eyes, worsening headache or neck stiffness.     ° DO use a Qtip to place antibiotic ointment in the very front inside your nostrils as needed to minimize or soften crusting. Neosporin, bacitracin or double/triple antibiotic ointments are all OK to use, up to twice a day     ° DO resume your normal preoperative medications (except aspirin or other blood thinners as noted above)    For the first week following surgery you should not blow your nose or perform any activities requiring exertion such as bending, straining, exercising or lifting anything heavier than 10 pounds. You should not plan any air travel in the first week after surgery.   Ultimately, it will take about  2-3 months for you to fully recover from surgery

## 2024-03-13 NOTE — PROGRESS NOTES
HPI  03/13/24  CT sinus reviewed and discussed with the patient notable for complete opacification of the right maxillary sinus, mucosal edema along the anterior ethmoid and frontal sinus outflow tract.  Endorses persistent facial pressure along the right maxillary and frontal region in addition to nasal congestion/obstruction.  Recall   58-year-old female presenting for evaluation of multiple sinonasal complaints.  Main Symptoms: Daily sinonasal symptoms include bilateral nasal congestion/obstruction, anterior and posterior nasal drainage, facial pressure along the right maxillary and frontal region  Duration: Years  Laterality: R>L  Recently underwent CT neck for evaluation of left neck lipoma which incidentally showed complete right maxillary sinus opacification.     Patient denies facial pain, facial numbness/weakness, ansomia, dental pain, immunotherapy.    No odynophagia/dysphagia/SOB/dyspnea/hoarseness/fevers/chills/weight loss/night sweats.    Current treatment:  Nasal Steroid: No (has used Flonase)  Sinus Rinse: No  Antihistamine: No  Prednisone: No  Other: None    Recent CT sinus: None    Previous nasal/sinus surgery: None     Past Medical History:   Diagnosis Date    Benign lipomatous neoplasm of skin and subcutaneous tissue of trunk 11/28/2017    Lipoma of back    Encounter for screening for malignant neoplasm of cervix 07/02/2019    Screening for cervical cancer    History of colonoscopy 11/21/2017 11/21/17    History of mammogram 10/23/2023    10/23/23 neg    Hypertrophy of uterus 10/14/2019    Enlarged uterus    Other bursitis of knee, unspecified knee 04/04/2019    Pes anserine bursitis    Other chest pain 07/06/2020    Atypical chest pain    Other shoulder lesions, right shoulder 04/05/2021    Tendinitis of right rotator cuff    Pain in left knee 04/04/2019    Left knee pain    Pain in right shoulder 05/26/2021    Pain in right shoulder    Personal history of diseases of the blood and  blood-forming organs and certain disorders involving the immune mechanism 10/14/2019    History of thrombocytosis    Personal history of diseases of the blood and blood-forming organs and certain disorders involving the immune mechanism 06/16/2020    History of iron deficiency anemia    Personal history of other diseases of the female genital tract 06/16/2020    History of menorrhagia    Personal history of other diseases of the respiratory system     History of asthma    Screening for cervical cancer 10/20/2022    10/20/22 NILM HPV neg    Unspecified rotator cuff tear or rupture of right shoulder, not specified as traumatic 04/06/2021    Rotator cuff tear, right    Urinary tract infection, site not specified 12/22/2020    Acute UTI        Past Surgical History:   Procedure Laterality Date    COLONOSCOPY  11/21/2017    Complete Colonoscopy    OTHER SURGICAL HISTORY  05/04/2016    Open Treat Tib Shaft & Fibular Fracture With Plate / Screws    OTHER SURGICAL HISTORY  10/11/2018    Surgery Excision Lipoma    OTHER SURGICAL HISTORY  04/22/2020    Biopsy Endometrial, Without Cervical Dilation        Current Outpatient Medications   Medication Instructions    cholecalciferol (VITAMIN D-3) 2,000 Units, oral, Daily    cyanocobalamin (VITAMIN B-12) 1,000 mcg, oral, Daily RT    diclofenac sodium (VOLTAREN) 4 g, Topical, 2 times daily PRN    ferrous sulfate (325 mg ferrous sulfate) 325 mg, oral, Daily with breakfast    fluticasone (Flonase) 50 mcg/actuation nasal spray 1 spray, Each Nostril, 2 times daily, Shake gently. Before first use, prime pump. After use, clean tip and replace cap.    ibuprofen 200 mg, oral, 4 times daily    levothyroxine (SYNTHROID, LEVOXYL) 112 mcg, oral, Daily before breakfast    predniSONE (Deltasone) 10 mg tablet 30mg PO Qday for 3 days, 20mg PO Qday for 3 days, 10 mg PO Qday for 4 days    sod chloride-sodium bicarb (Neilmed Sinus Rinse Complete) nasal rinse Irrigate your nose per instructions twice  "daily        Allergies   Allergen Reactions    Codeine Other and Nausea Only     \"I feel like I'm in a fog\"        Review of Systems  A detailed 12 point ROS was performed and is negative except as noted in the intake form, HPI and/or Past Medical History    Physical Exam   CONSTITUTIONAL: Well developed, well nourished.  VOICE: Normal voice quality  RESPIRATION: Breathing comfortably, no stridor.  CV: No clubbing/cyanosis/edema in hands.  EYES: EOM Intact, sclera normal.  NEURO: Alert and oriented times 3, Cranial nerves V,VII intact and symmetric bilaterally.  HEAD AND FACE: Symmetric facial features, no masses or lesions, sinuses nontender to palpation.  SALIVARY GLANDS: Parotid and submandibular glands normal bilaterally.  EARS: Normal external ears, external auditory canals, and TMs to otoscopy  + NOSE: External nose midline, anterior rhinoscopy is normal with limited visualization to the anterior aspect of the interior turbinates. No lesions noted.  Bilateral inferior turbinate hypertrophy  Small right caudal septal spur  ORAL CAVITY/OROPHARYNX/LIPS: Normal mucous membranes, normal floor of mouth/tongue/OP, no masses or lesions are noted.  PHARYNGEAL WALLS AND NASOPHARYNX: No masses noted. Mucosa appears clean and moist  + NECK/LYMPH: No LAD, no thyroid masses. Trachea palpably midline  Fullness along the posterior border of left SCM  (Lipoma per CT neck)  SKIN: Neck skin is without injury  PSYCH: Alert and oriented with appropriate mood and affect       Assessment  Chronic rhinosinusitis  Right maxillary sinusitis, possible mycetoma  Facial pressure  Nasal airway obstruction  Nasal septal deviation  Nasal drainage  Bilateral inferior turbinate hypertrophy      Plan  The patient and I discussed their current nasal / sinus symptoms as well as several therapeutic options.  The patient reports no significant improvement of her sinonasal symptoms following adequate medical therapy.  Multiple treatment " alternatives, risks and benefits discussed she would like to proceed with surgery including right functional endoscopic sinus surgery, bilateral inferior turbinate reduction/outfracture, possible septoplasty.    The patient and I discussed the aspects of the proposed surgery today. We discussed alternative therapies, which include doing nothing and continuing medical therapy. We discussed the fact that surgery offers an opportunity to correct any anatomic deformities and to relieve a problem that has not been improved with medical therapy. The anticipated benefits from endoscopic surgery include but are not limited to: eradication of sinus disease, relief from pain and symptoms. The risks and complications associated with this surgery were discussed. These include, but were not limited to: anesthesia/medication complications (cva, mi, death), heart/lung/brain dysfunction, synechiae formation, poor cosmetic result possibly requiring reconstructive surgery, septal perforation possibly requiring reconstructive surgery, bleeding, infection, postoperative pain, failure to relieve pain and other preoperative symptoms, visual acuity changes (temporary or permanent), loss of smell/taste, csf leak, need for further surgeries in future. Each of these potential complications was discussed in detail.  No guarantees were implied and the possibility of recurrence was discussed and understood. The nature of the procedure and the pre and postoperative processes were discussed as well.     The patient understands, asked appropriate questions, and wishes to proceed with surgery.

## 2024-05-14 ENCOUNTER — TELEPHONE (OUTPATIENT)
Dept: PRIMARY CARE | Facility: CLINIC | Age: 59
End: 2024-05-14

## 2024-05-14 ENCOUNTER — OFFICE VISIT (OUTPATIENT)
Dept: OTOLARYNGOLOGY | Facility: CLINIC | Age: 59
End: 2024-05-14
Payer: COMMERCIAL

## 2024-05-14 VITALS — HEIGHT: 67 IN | BODY MASS INDEX: 35.31 KG/M2 | WEIGHT: 225 LBS

## 2024-05-14 DIAGNOSIS — J32.9 CHRONIC RHINOSINUSITIS: Primary | ICD-10-CM

## 2024-05-14 PROCEDURE — 1036F TOBACCO NON-USER: CPT | Performed by: STUDENT IN AN ORGANIZED HEALTH CARE EDUCATION/TRAINING PROGRAM

## 2024-05-14 PROCEDURE — 3008F BODY MASS INDEX DOCD: CPT | Performed by: STUDENT IN AN ORGANIZED HEALTH CARE EDUCATION/TRAINING PROGRAM

## 2024-05-14 PROCEDURE — 99214 OFFICE O/P EST MOD 30 MIN: CPT | Performed by: STUDENT IN AN ORGANIZED HEALTH CARE EDUCATION/TRAINING PROGRAM

## 2024-05-14 RX ORDER — PREDNISONE 10 MG/1
TABLET ORAL
Qty: 24 TABLET | Refills: 0 | Status: SHIPPED | OUTPATIENT
Start: 2024-05-14

## 2024-05-14 NOTE — PROGRESS NOTES
HPI  5/14/24  The patient presents for follow-up, she is here to discuss her upcoming surgery and clarify questions.  03/13/24  CT sinus reviewed and discussed with the patient notable for complete opacification of the right maxillary sinus, mucosal edema along the anterior ethmoid and frontal sinus outflow tract.  Endorses persistent facial pressure along the right maxillary and frontal region in addition to nasal congestion/obstruction.  Recall   58-year-old female presenting for evaluation of multiple sinonasal complaints.  Main Symptoms: Daily sinonasal symptoms include bilateral nasal congestion/obstruction, anterior and posterior nasal drainage, facial pressure along the right maxillary and frontal region  Duration: Years  Laterality: R>L  Recently underwent CT neck for evaluation of left neck lipoma which incidentally showed complete right maxillary sinus opacification.     Patient denies facial pain, facial numbness/weakness, ansomia, dental pain, immunotherapy.    No odynophagia/dysphagia/SOB/dyspnea/hoarseness/fevers/chills/weight loss/night sweats.    Current treatment:  Nasal Steroid: No (has used Flonase)  Sinus Rinse: No  Antihistamine: No  Prednisone: No  Other: None    Recent CT sinus: None    Previous nasal/sinus surgery: None     Past Medical History:   Diagnosis Date    Benign lipomatous neoplasm of skin and subcutaneous tissue of trunk 11/28/2017    Lipoma of back    Encounter for screening for malignant neoplasm of cervix 07/02/2019    Screening for cervical cancer    History of colonoscopy 11/21/2017 11/21/17    History of mammogram 10/23/2023    10/23/23 neg    Hypertrophy of uterus 10/14/2019    Enlarged uterus    Other bursitis of knee, unspecified knee 04/04/2019    Pes anserine bursitis    Other chest pain 07/06/2020    Atypical chest pain    Other shoulder lesions, right shoulder 04/05/2021    Tendinitis of right rotator cuff    Pain in left knee 04/04/2019    Left knee pain    Pain in  right shoulder 05/26/2021    Pain in right shoulder    Personal history of diseases of the blood and blood-forming organs and certain disorders involving the immune mechanism 10/14/2019    History of thrombocytosis    Personal history of diseases of the blood and blood-forming organs and certain disorders involving the immune mechanism 06/16/2020    History of iron deficiency anemia    Personal history of other diseases of the female genital tract 06/16/2020    History of menorrhagia    Personal history of other diseases of the respiratory system     History of asthma    Screening for cervical cancer 10/20/2022    10/20/22 NILM HPV neg    Unspecified rotator cuff tear or rupture of right shoulder, not specified as traumatic 04/06/2021    Rotator cuff tear, right    Urinary tract infection, site not specified 12/22/2020    Acute UTI        Past Surgical History:   Procedure Laterality Date    COLONOSCOPY  11/21/2017    Complete Colonoscopy    OTHER SURGICAL HISTORY  05/04/2016    Open Treat Tib Shaft & Fibular Fracture With Plate / Screws    OTHER SURGICAL HISTORY  10/11/2018    Surgery Excision Lipoma    OTHER SURGICAL HISTORY  04/22/2020    Biopsy Endometrial, Without Cervical Dilation        Current Outpatient Medications   Medication Instructions    cholecalciferol (VITAMIN D-3) 2,000 Units, oral, Daily    cyanocobalamin (VITAMIN B-12) 1,000 mcg, oral, Daily RT    diclofenac sodium (VOLTAREN) 4 g, Topical, 2 times daily PRN    ferrous sulfate (325 mg ferrous sulfate) 325 mg, oral, Daily with breakfast    fluticasone (Flonase) 50 mcg/actuation nasal spray 1 spray, Each Nostril, 2 times daily, Shake gently. Before first use, prime pump. After use, clean tip and replace cap.    ibuprofen 200 mg, oral, 4 times daily    levothyroxine (SYNTHROID, LEVOXYL) 112 mcg, oral, Daily before breakfast    predniSONE (Deltasone) 10 mg tablet 30mg PO Qday for 3 days, 20mg PO Qday for 3 days, 10 mg PO Qday for 4 days    sod  "chloride-sodium bicarb (Neilmed Sinus Rinse Complete) nasal rinse Irrigate your nose per instructions twice daily        Allergies   Allergen Reactions    Codeine Other and Nausea Only     \"I feel like I'm in a fog\"        Review of Systems  A detailed 12 point ROS was performed and is negative except as noted in the intake form, HPI and/or Past Medical History    Physical Exam   CONSTITUTIONAL: Well developed, well nourished.  VOICE: Normal voice quality  RESPIRATION: Breathing comfortably, no stridor.  CV: No clubbing/cyanosis/edema in hands.  EYES: EOM Intact, sclera normal.  NEURO: Alert and oriented times 3, Cranial nerves V,VII intact and symmetric bilaterally.  HEAD AND FACE: Symmetric facial features, no masses or lesions, sinuses nontender to palpation.  SALIVARY GLANDS: Parotid and submandibular glands normal bilaterally.  EARS: Normal external ears, external auditory canals, and TMs to otoscopy  + NOSE: External nose midline, anterior rhinoscopy is normal with limited visualization to the anterior aspect of the interior turbinates. No lesions noted.  Bilateral inferior turbinate hypertrophy  Small right caudal septal spur  ORAL CAVITY/OROPHARYNX/LIPS: Normal mucous membranes, normal floor of mouth/tongue/OP, no masses or lesions are noted.  PHARYNGEAL WALLS AND NASOPHARYNX: No masses noted. Mucosa appears clean and moist  + NECK/LYMPH: No LAD, no thyroid masses. Trachea palpably midline  Fullness along the posterior border of left SCM  (Lipoma per CT neck)  SKIN: Neck skin is without injury  PSYCH: Alert and oriented with appropriate mood and affect       Assessment  Chronic rhinosinusitis  Right maxillary sinusitis, possible mycetoma  Facial pressure  Nasal airway obstruction  Nasal septal deviation  Nasal drainage  Bilateral inferior turbinate hypertrophy      Plan  The patient and I discussed their current nasal / sinus symptoms as well as several therapeutic options.  The patient reports no " significant improvement of her sinonasal symptoms following adequate medical therapy.  Multiple treatment alternatives, risks and benefits discussed she would like to proceed with surgery including right functional endoscopic sinus surgery, bilateral inferior turbinate reduction/outfracture, possible septoplasty.    The patient and I discussed the aspects of the proposed surgery today. We discussed alternative therapies, which include doing nothing and continuing medical therapy. We discussed the fact that surgery offers an opportunity to correct any anatomic deformities and to relieve a problem that has not been improved with medical therapy. The anticipated benefits from endoscopic surgery include but are not limited to: eradication of sinus disease, relief from pain and symptoms. The risks and complications associated with this surgery were discussed. These include, but were not limited to: anesthesia/medication complications (cva, mi, death), heart/lung/brain dysfunction, synechiae formation, poor cosmetic result possibly requiring reconstructive surgery, septal perforation possibly requiring reconstructive surgery, bleeding, infection, postoperative pain, failure to relieve pain and other preoperative symptoms, visual acuity changes (temporary or permanent), loss of smell/taste, csf leak, need for further surgeries in future. Each of these potential complications was discussed in detail.  No guarantees were implied and the possibility of recurrence was discussed and understood. The nature of the procedure and the pre and postoperative processes were discussed as well.     The patient understands, asked appropriate questions, and wishes to proceed with surgery.   Preoperative steroid taper prescribed.

## 2024-05-14 NOTE — TELEPHONE ENCOUNTER
Patient is scheduled for sinus surgery next week     She noticed a floater in left eye Saturday (new issue, has never experienced this prior; comes and goes)    She would like advice/referral.  Office full

## 2024-05-14 NOTE — TELEPHONE ENCOUNTER
Needs to see an eye doctor - I am not sure who is in her network.  A general optometrist is fine.  Here are 2 options   Dr. Supa Mendoza

## 2024-05-23 PROCEDURE — 88312 SPECIAL STAINS GROUP 1: CPT

## 2024-05-23 PROCEDURE — 88311 DECALCIFY TISSUE: CPT

## 2024-05-23 PROCEDURE — 0753T DGTZ GLS MCRSCP SLD LEVEL IV: CPT

## 2024-05-23 PROCEDURE — 88312 SPECIAL STAINS GROUP 1: CPT | Performed by: PATHOLOGY

## 2024-05-23 PROCEDURE — 0756T DGTZ GLS MCRSCP SLD SPC GRPI: CPT

## 2024-05-23 PROCEDURE — 88305 TISSUE EXAM BY PATHOLOGIST: CPT

## 2024-05-23 PROCEDURE — 88305 TISSUE EXAM BY PATHOLOGIST: CPT | Performed by: PATHOLOGY

## 2024-05-23 PROCEDURE — 88311 DECALCIFY TISSUE: CPT | Performed by: PATHOLOGY

## 2024-05-24 ENCOUNTER — LAB REQUISITION (OUTPATIENT)
Dept: LAB | Facility: HOSPITAL | Age: 59
End: 2024-05-24

## 2024-05-31 ENCOUNTER — OFFICE VISIT (OUTPATIENT)
Dept: OTOLARYNGOLOGY | Facility: CLINIC | Age: 59
End: 2024-05-31
Payer: COMMERCIAL

## 2024-05-31 VITALS — BODY MASS INDEX: 35.31 KG/M2 | HEIGHT: 67 IN | WEIGHT: 225 LBS

## 2024-05-31 DIAGNOSIS — J34.3 HYPERTROPHY OF BOTH INFERIOR NASAL TURBINATES: ICD-10-CM

## 2024-05-31 DIAGNOSIS — J34.89 NASAL CRUSTING: Primary | ICD-10-CM

## 2024-05-31 DIAGNOSIS — J34.89 NASAL DRAINAGE: ICD-10-CM

## 2024-05-31 DIAGNOSIS — R09.81 NASAL CONGESTION: ICD-10-CM

## 2024-05-31 DIAGNOSIS — R44.8 FACIAL PRESSURE: ICD-10-CM

## 2024-05-31 DIAGNOSIS — J32.9 CHRONIC RHINOSINUSITIS: ICD-10-CM

## 2024-05-31 DIAGNOSIS — J34.89 NASAL OBSTRUCTION: ICD-10-CM

## 2024-05-31 PROCEDURE — 31237 NSL/SINS NDSC SURG BX POLYPC: CPT | Performed by: STUDENT IN AN ORGANIZED HEALTH CARE EDUCATION/TRAINING PROGRAM

## 2024-05-31 PROCEDURE — 3008F BODY MASS INDEX DOCD: CPT | Performed by: STUDENT IN AN ORGANIZED HEALTH CARE EDUCATION/TRAINING PROGRAM

## 2024-05-31 PROCEDURE — 99024 POSTOP FOLLOW-UP VISIT: CPT | Performed by: STUDENT IN AN ORGANIZED HEALTH CARE EDUCATION/TRAINING PROGRAM

## 2024-05-31 PROCEDURE — 1036F TOBACCO NON-USER: CPT | Performed by: STUDENT IN AN ORGANIZED HEALTH CARE EDUCATION/TRAINING PROGRAM

## 2024-05-31 RX ORDER — HYDROCODONE BITARTRATE AND ACETAMINOPHEN 5; 325 MG/1; MG/1
1 TABLET ORAL EVERY 4 HOURS PRN
COMMUNITY
Start: 2024-05-23

## 2024-05-31 RX ORDER — ONDANSETRON 4 MG/1
4 TABLET, FILM COATED ORAL EVERY 8 HOURS PRN
COMMUNITY
Start: 2024-05-23

## 2024-05-31 RX ORDER — DOXYCYCLINE HYCLATE 100 MG
100 TABLET ORAL EVERY 12 HOURS
COMMUNITY
Start: 2024-05-23 | End: 2024-06-02

## 2024-05-31 NOTE — PROGRESS NOTES
HPI  The patient presents for follow up.   Most recent surgery: 5/23/24  1.  Right nasal endoscopy with partial ethmoidectomy  2.  Right nasal endoscopy with frontal sinusotomy  3.  Right maxillary endoscopy with tissue removal   4.  Bilateral submucosal inferior turbinate reductions    Current symptoms: The patient present for follow-up, endorses nasal crusting removed with saline rinses, no other sinonasal complaint.    Current treatment:  Antibiotics: Yes  Sinus Rinse: Yes  Steroids: No  Other: None      ROS - No fevers, chills. No cough, hemoptysis. No n/v       Past Medical History:   Diagnosis Date    Benign lipomatous neoplasm of skin and subcutaneous tissue of trunk 11/28/2017    Lipoma of back    Encounter for screening for malignant neoplasm of cervix 07/02/2019    Screening for cervical cancer    History of colonoscopy 11/21/2017 11/21/17    History of mammogram 10/23/2023    10/23/23 neg    Hypertrophy of uterus 10/14/2019    Enlarged uterus    Other bursitis of knee, unspecified knee 04/04/2019    Pes anserine bursitis    Other chest pain 07/06/2020    Atypical chest pain    Other shoulder lesions, right shoulder 04/05/2021    Tendinitis of right rotator cuff    Pain in left knee 04/04/2019    Left knee pain    Pain in right shoulder 05/26/2021    Pain in right shoulder    Personal history of diseases of the blood and blood-forming organs and certain disorders involving the immune mechanism 10/14/2019    History of thrombocytosis    Personal history of diseases of the blood and blood-forming organs and certain disorders involving the immune mechanism 06/16/2020    History of iron deficiency anemia    Personal history of other diseases of the female genital tract 06/16/2020    History of menorrhagia    Personal history of other diseases of the respiratory system     History of asthma    Screening for cervical cancer 10/20/2022    10/20/22 NILM HPV neg    Unspecified rotator cuff tear or rupture of  right shoulder, not specified as traumatic 04/06/2021    Rotator cuff tear, right    Urinary tract infection, site not specified 12/22/2020    Acute UTI       Past Surgical History:   Procedure Laterality Date    COLONOSCOPY  11/21/2017    Complete Colonoscopy    OTHER SURGICAL HISTORY  05/04/2016    Open Treat Tib Shaft & Fibular Fracture With Plate / Screws    OTHER SURGICAL HISTORY  10/11/2018    Surgery Excision Lipoma    OTHER SURGICAL HISTORY  04/22/2020    Biopsy Endometrial, Without Cervical Dilation         Current Outpatient Medications on File Prior to Visit   Medication Sig Dispense Refill    cholecalciferol (Vitamin D-3) 50 mcg (2,000 unit) capsule Take 1 capsule (50 mcg) by mouth early in the morning..      cyanocobalamin (Vitamin B-12) 1,000 mcg tablet Take 1 tablet (1,000 mcg) by mouth once daily.      diclofenac sodium (Voltaren) 1 % gel gel Apply 4.5 inches (4 g) topically 2 times a day as needed.      doxycycline (Vibra-Tabs) 100 mg tablet Take 1 tablet (100 mg) by mouth every 12 hours.      ferrous sulfate, 325 mg ferrous sulfate, tablet Take 1 tablet by mouth once daily with breakfast.      fluticasone (Flonase) 50 mcg/actuation nasal spray Administer 1 spray into each nostril 2 times a day. Shake gently. Before first use, prime pump. After use, clean tip and replace cap. 48 g 3    ibuprofen 200 mg tablet Take 1 tablet (200 mg) by mouth 4 times a day.      levothyroxine (Synthroid, Levoxyl) 112 mcg tablet Take 1 tablet (112 mcg) by mouth once daily in the morning. Take before meals. 90 tablet 3    sod chloride-sodium bicarb (Neilmed Sinus Rinse Complete) nasal rinse Irrigate your nose per instructions twice daily 1 each 3    HYDROcodone-acetaminophen (Norco) 5-325 mg tablet Take 1 tablet by mouth every 4 hours if needed for moderate pain (4 - 6).      ondansetron (Zofran) 4 mg tablet Take 1 tablet (4 mg) by mouth every 8 hours if needed for nausea.      predniSONE (Deltasone) 10 mg tablet TAKE  "ORALLY: 4 TABLETS DAILY FOR 3 DAYS, 3 TABLETS DAILY FOR 2 DAYS, 2 TABLETS DAILY FOR 2 DAYS AND 1 TABLET DAILY FOR 2 DAYS, THEN STOP. (Patient not taking: Reported on 5/31/2024) 24 tablet 0     No current facility-administered medications on file prior to visit.        Allergies   Allergen Reactions    Codeine Other and Nausea Only     \"I feel like I'm in a fog\"        Review of Systems  A detailed 12 point ROS was performed and is negative except as noted in the intake form, HPI and/or Past Medical History        Physical Exam   CONSTITUTIONAL: Well developed, well nourished.  VOICE: Normal voice quality  RESPIRATION: Breathing comfortably, no stridor.  CV: No clubbing/cyanosis/edema in hands.  EYES: EOM Intact, sclera normal.  NEURO: Alert and oriented times 3, Cranial nerves V,VII intact and symmetric bilaterally.  HEAD AND FACE: Symmetric facial features, no masses or lesions, sinuses nontender to palpation.  SALIVARY GLANDS: Parotid and submandibular glands normal bilaterally.  EARS: Normal external ears, external auditory canals, and TMs to otoscopy  + NOSE: External nose midline, anterior rhinoscopy is normal with limited visualization to the anterior aspect of the interior turbinates. No lesions noted.  ORAL CAVITY/OROPHARYNX/LIPS: Normal mucous membranes, normal floor of mouth/tongue/OP, no masses or lesions are noted.  PHARYNGEAL WALLS AND NASOPHARYNX: No masses noted. Mucosa appears clean and moist  + NECK/LYMPH: No LAD, no thyroid masses. Trachea palpably midline  SKIN: Neck skin is without injury  PSYCH: Alert and oriented with appropriate mood and affect    Procedure: bilateral nasal endoscopy with debridement  Pre-op dx: Chronic rhinosinusitis  Post-op dx: same    Procedure in detail:   The risks, benefits, and alternatives were explained.  The patient wished to proceed and provided verbal consent.    After topical anesthetic was given and nasal endoscopy with debridement was performed " bilaterally:    Sinus endoscopy:     Right:    Patent nasal corridor  Maxillary antrostomy patent  Ethmoid clear   Frontal recess patent     Left:   Patent nasal corridor    Notable findings: Nasal crusts removed from right middle meatus and anterior ethmoid region.  Right generalized mucosal edema.  Widely patent nasal corridors bilaterally.      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

## 2024-06-11 LAB
LABORATORY COMMENT REPORT: NORMAL
PATH REPORT.FINAL DX SPEC: NORMAL
PATH REPORT.GROSS SPEC: NORMAL
PATH REPORT.TOTAL CANCER: NORMAL

## 2024-06-12 ENCOUNTER — TELEPHONE (OUTPATIENT)
Dept: PRIMARY CARE | Facility: CLINIC | Age: 59
End: 2024-06-12
Payer: COMMERCIAL

## 2024-06-12 NOTE — TELEPHONE ENCOUNTER
Please inform patient I received a copy of her pathology report from Dr. Rubin Ybarra - showing fungal / mold elements from sinus surgery -     Did he recommend treatment with an antifungal medication?  Or want her to see an infectious disease specialist?

## 2024-06-12 NOTE — TELEPHONE ENCOUNTER
Per patient, Dr. Conklin did not refer her however she has a follow up appointment with him this coming Friday 6/15    She was prescribed doxycycline post op for 10 days

## 2024-06-14 ENCOUNTER — APPOINTMENT (OUTPATIENT)
Dept: OTOLARYNGOLOGY | Facility: CLINIC | Age: 59
End: 2024-06-14
Payer: COMMERCIAL

## 2024-06-14 VITALS — WEIGHT: 225 LBS | BODY MASS INDEX: 35.31 KG/M2 | HEIGHT: 67 IN

## 2024-06-14 DIAGNOSIS — J32.9 CHRONIC RHINOSINUSITIS: Primary | ICD-10-CM

## 2024-06-14 DIAGNOSIS — J34.89 NASAL CRUSTING: ICD-10-CM

## 2024-06-14 DIAGNOSIS — R09.81 NASAL CONGESTION: ICD-10-CM

## 2024-06-14 DIAGNOSIS — J34.89 NASAL DRAINAGE: ICD-10-CM

## 2024-06-14 DIAGNOSIS — J34.89 NASAL OBSTRUCTION: ICD-10-CM

## 2024-06-14 DIAGNOSIS — J34.3 HYPERTROPHY OF BOTH INFERIOR NASAL TURBINATES: ICD-10-CM

## 2024-06-14 DIAGNOSIS — R44.8 FACIAL PRESSURE: ICD-10-CM

## 2024-06-14 PROCEDURE — 1036F TOBACCO NON-USER: CPT | Performed by: STUDENT IN AN ORGANIZED HEALTH CARE EDUCATION/TRAINING PROGRAM

## 2024-06-14 PROCEDURE — 3008F BODY MASS INDEX DOCD: CPT | Performed by: STUDENT IN AN ORGANIZED HEALTH CARE EDUCATION/TRAINING PROGRAM

## 2024-06-14 PROCEDURE — 99024 POSTOP FOLLOW-UP VISIT: CPT | Performed by: STUDENT IN AN ORGANIZED HEALTH CARE EDUCATION/TRAINING PROGRAM

## 2024-06-14 PROCEDURE — 31237 NSL/SINS NDSC SURG BX POLYPC: CPT | Performed by: STUDENT IN AN ORGANIZED HEALTH CARE EDUCATION/TRAINING PROGRAM

## 2024-06-14 RX ORDER — MOMETASONE FUROATE 100 %
POWDER (GRAM) MISCELLANEOUS 2 TIMES DAILY
COMMUNITY

## 2024-06-14 NOTE — PROGRESS NOTES
HPI  The patient presents for follow up.   Most recent surgery: 5/23/24  1.  Right nasal endoscopy with partial ethmoidectomy  2.  Right nasal endoscopy with frontal sinusotomy  3.  Right maxillary endoscopy with tissue removal   4.  Bilateral submucosal inferior turbinate reductions    Current symptoms: The patient present for follow-up, using saline irrigations.  Endorses occasional right facial pressure, no other complaints.    Pathology reviewed consistent with aggregates of fungal hyphae and sinonasal mucosa with chronic inflammation.    Current treatment:  Antibiotics: No  Sinus Rinse: Yes  Steroids: Saline/mometasone rinses  Other: None      ROS - No fevers, chills. No cough, hemoptysis. No n/v       Past Medical History:   Diagnosis Date    Benign lipomatous neoplasm of skin and subcutaneous tissue of trunk 11/28/2017    Lipoma of back    Encounter for screening for malignant neoplasm of cervix 07/02/2019    Screening for cervical cancer    History of colonoscopy 11/21/2017 11/21/17    History of mammogram 10/23/2023    10/23/23 neg    Hypertrophy of uterus 10/14/2019    Enlarged uterus    Other bursitis of knee, unspecified knee 04/04/2019    Pes anserine bursitis    Other chest pain 07/06/2020    Atypical chest pain    Other shoulder lesions, right shoulder 04/05/2021    Tendinitis of right rotator cuff    Pain in left knee 04/04/2019    Left knee pain    Pain in right shoulder 05/26/2021    Pain in right shoulder    Personal history of diseases of the blood and blood-forming organs and certain disorders involving the immune mechanism 10/14/2019    History of thrombocytosis    Personal history of diseases of the blood and blood-forming organs and certain disorders involving the immune mechanism 06/16/2020    History of iron deficiency anemia    Personal history of other diseases of the female genital tract 06/16/2020    History of menorrhagia    Personal history of other diseases of the respiratory  system     History of asthma    Screening for cervical cancer 10/20/2022    10/20/22 NILM HPV neg    Unspecified rotator cuff tear or rupture of right shoulder, not specified as traumatic 04/06/2021    Rotator cuff tear, right    Urinary tract infection, site not specified 12/22/2020    Acute UTI       Past Surgical History:   Procedure Laterality Date    COLONOSCOPY  11/21/2017    Complete Colonoscopy    OTHER SURGICAL HISTORY  05/04/2016    Open Treat Tib Shaft & Fibular Fracture With Plate / Screws    OTHER SURGICAL HISTORY  10/11/2018    Surgery Excision Lipoma    OTHER SURGICAL HISTORY  04/22/2020    Biopsy Endometrial, Without Cervical Dilation         Current Outpatient Medications on File Prior to Visit   Medication Sig Dispense Refill    cholecalciferol (Vitamin D-3) 50 mcg (2,000 unit) capsule Take 1 capsule (50 mcg) by mouth early in the morning..      cyanocobalamin (Vitamin B-12) 1,000 mcg tablet Take 1 tablet (1,000 mcg) by mouth once daily.      diclofenac sodium (Voltaren) 1 % gel gel Apply 4.5 inches (4 g) topically 2 times a day as needed.      ferrous sulfate, 325 mg ferrous sulfate, tablet Take 1 tablet by mouth once daily with breakfast.      fluticasone (Flonase) 50 mcg/actuation nasal spray Administer 1 spray into each nostril 2 times a day. Shake gently. Before first use, prime pump. After use, clean tip and replace cap. 48 g 3    HYDROcodone-acetaminophen (Norco) 5-325 mg tablet Take 1 tablet by mouth every 4 hours if needed for moderate pain (4 - 6).      ibuprofen 200 mg tablet Take 1 tablet (200 mg) by mouth 4 times a day.      levothyroxine (Synthroid, Levoxyl) 112 mcg tablet Take 1 tablet (112 mcg) by mouth once daily in the morning. Take before meals. 90 tablet 3    mometasone furoate, bulk, 100 % powder 2 times a day.      ondansetron (Zofran) 4 mg tablet Take 1 tablet (4 mg) by mouth every 8 hours if needed for nausea.      sod chloride-sodium bicarb (Neilmed Sinus Rinse Complete)  "nasal rinse Irrigate your nose per instructions twice daily 1 each 3    predniSONE (Deltasone) 10 mg tablet TAKE ORALLY: 4 TABLETS DAILY FOR 3 DAYS, 3 TABLETS DAILY FOR 2 DAYS, 2 TABLETS DAILY FOR 2 DAYS AND 1 TABLET DAILY FOR 2 DAYS, THEN STOP. (Patient not taking: Reported on 5/31/2024) 24 tablet 0     No current facility-administered medications on file prior to visit.        Allergies   Allergen Reactions    Codeine Other and Nausea Only     \"I feel like I'm in a fog\"        Review of Systems  A detailed 12 point ROS was performed and is negative except as noted in the intake form, HPI and/or Past Medical History        Physical Exam   CONSTITUTIONAL: Well developed, well nourished.  VOICE: Normal voice quality  RESPIRATION: Breathing comfortably, no stridor.  CV: No clubbing/cyanosis/edema in hands.  EYES: EOM Intact, sclera normal.  NEURO: Alert and oriented times 3, Cranial nerves V,VII intact and symmetric bilaterally.  HEAD AND FACE: Symmetric facial features, no masses or lesions, sinuses nontender to palpation.  SALIVARY GLANDS: Parotid and submandibular glands normal bilaterally.  EARS: Normal external ears, external auditory canals, and TMs to otoscopy  + NOSE: External nose midline, anterior rhinoscopy is normal with limited visualization to the anterior aspect of the interior turbinates. No lesions noted.  ORAL CAVITY/OROPHARYNX/LIPS: Normal mucous membranes, normal floor of mouth/tongue/OP, no masses or lesions are noted.  PHARYNGEAL WALLS AND NASOPHARYNX: No masses noted. Mucosa appears clean and moist  + NECK/LYMPH: No LAD, no thyroid masses. Trachea palpably midline  SKIN: Neck skin is without injury  PSYCH: Alert and oriented with appropriate mood and affect    Procedure: bilateral nasal endoscopy with debridement  Pre-op dx: Chronic rhinosinusitis  Post-op dx: same    Procedure in detail:   The risks, benefits, and alternatives were explained.  The patient wished to proceed and provided verbal " consent.    After topical anesthetic was given and nasal endoscopy with debridement was performed bilaterally:    Sinus endoscopy:     Right:    Patent nasal corridor  Maxillary antrostomy patent  Ethmoid clear   Frontal recess patent     Left:   Patent nasal corridor    Notable findings: Nasal crusts removed from right middle meatus and anterior ethmoid region.  Right generalized mucosal edema.  Widely patent nasal corridors.      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

## 2024-07-01 ENCOUNTER — HOSPITAL ENCOUNTER (OUTPATIENT)
Dept: RADIOLOGY | Facility: CLINIC | Age: 59
Discharge: HOME | End: 2024-07-01
Payer: COMMERCIAL

## 2024-07-01 DIAGNOSIS — R93.89 ABNORMAL CT OF THE CHEST: ICD-10-CM

## 2024-07-01 PROCEDURE — 71250 CT THORAX DX C-: CPT

## 2024-09-16 ENCOUNTER — APPOINTMENT (OUTPATIENT)
Dept: OTOLARYNGOLOGY | Facility: CLINIC | Age: 59
End: 2024-09-16
Payer: COMMERCIAL

## 2024-09-16 VITALS — HEIGHT: 67 IN | WEIGHT: 225 LBS | BODY MASS INDEX: 35.31 KG/M2

## 2024-09-16 DIAGNOSIS — R09.81 NASAL CONGESTION: ICD-10-CM

## 2024-09-16 DIAGNOSIS — J34.3 HYPERTROPHY OF BOTH INFERIOR NASAL TURBINATES: ICD-10-CM

## 2024-09-16 DIAGNOSIS — J34.89 NASAL OBSTRUCTION: ICD-10-CM

## 2024-09-16 DIAGNOSIS — J32.9 CHRONIC RHINOSINUSITIS: Primary | ICD-10-CM

## 2024-09-16 DIAGNOSIS — R44.8 FACIAL PRESSURE: ICD-10-CM

## 2024-09-16 DIAGNOSIS — J34.89 NASAL DRAINAGE: ICD-10-CM

## 2024-09-16 DIAGNOSIS — J34.89 NASAL CRUSTING: ICD-10-CM

## 2024-09-16 PROCEDURE — 99213 OFFICE O/P EST LOW 20 MIN: CPT | Performed by: STUDENT IN AN ORGANIZED HEALTH CARE EDUCATION/TRAINING PROGRAM

## 2024-09-16 PROCEDURE — 31231 NASAL ENDOSCOPY DX: CPT | Performed by: STUDENT IN AN ORGANIZED HEALTH CARE EDUCATION/TRAINING PROGRAM

## 2024-09-16 PROCEDURE — 1036F TOBACCO NON-USER: CPT | Performed by: STUDENT IN AN ORGANIZED HEALTH CARE EDUCATION/TRAINING PROGRAM

## 2024-09-16 PROCEDURE — 3008F BODY MASS INDEX DOCD: CPT | Performed by: STUDENT IN AN ORGANIZED HEALTH CARE EDUCATION/TRAINING PROGRAM

## 2024-09-16 NOTE — PROGRESS NOTES
HPI  The patient presents for follow up.   Most recent surgery: 5/23/24  1.  Right nasal endoscopy with partial ethmoidectomy  2.  Right nasal endoscopy with frontal sinusotomy  3.  Right maxillary endoscopy with tissue removal   4.  Bilateral submucosal inferior turbinate reductions    Current symptoms: The patient present for follow-up, reports significant improvement of her sinonasal symptomatology.  Currently has no complaints.       Current treatment:  Antibiotics: No  Sinus Rinse: Yes  Steroids: Stopped saline/mometasone rinses   Other: None      ROS - No fevers, chills. No cough, hemoptysis. No n/v       Past Medical History:   Diagnosis Date    Benign lipomatous neoplasm of skin and subcutaneous tissue of trunk 11/28/2017    Lipoma of back    Encounter for screening for malignant neoplasm of cervix 07/02/2019    Screening for cervical cancer    History of colonoscopy 11/21/2017 11/21/17    History of mammogram 10/23/2023    10/23/23 neg    Hypertrophy of uterus 10/14/2019    Enlarged uterus    Other bursitis of knee, unspecified knee 04/04/2019    Pes anserine bursitis    Other chest pain 07/06/2020    Atypical chest pain    Other shoulder lesions, right shoulder 04/05/2021    Tendinitis of right rotator cuff    Pain in left knee 04/04/2019    Left knee pain    Pain in right shoulder 05/26/2021    Pain in right shoulder    Personal history of diseases of the blood and blood-forming organs and certain disorders involving the immune mechanism 10/14/2019    History of thrombocytosis    Personal history of diseases of the blood and blood-forming organs and certain disorders involving the immune mechanism 06/16/2020    History of iron deficiency anemia    Personal history of other diseases of the female genital tract 06/16/2020    History of menorrhagia    Personal history of other diseases of the respiratory system     History of asthma    Screening for cervical cancer 10/20/2022    10/20/22 NILM HPV neg     Unspecified rotator cuff tear or rupture of right shoulder, not specified as traumatic 04/06/2021    Rotator cuff tear, right    Urinary tract infection, site not specified 12/22/2020    Acute UTI       Past Surgical History:   Procedure Laterality Date    COLONOSCOPY  11/21/2017    Complete Colonoscopy    OTHER SURGICAL HISTORY  05/04/2016    Open Treat Tib Shaft & Fibular Fracture With Plate / Screws    OTHER SURGICAL HISTORY  10/11/2018    Surgery Excision Lipoma    OTHER SURGICAL HISTORY  04/22/2020    Biopsy Endometrial, Without Cervical Dilation         Current Outpatient Medications on File Prior to Visit   Medication Sig Dispense Refill    cholecalciferol (Vitamin D-3) 50 mcg (2,000 unit) capsule Take 1 capsule (50 mcg) by mouth early in the morning..      cyanocobalamin (Vitamin B-12) 1,000 mcg tablet Take 1 tablet (1,000 mcg) by mouth once daily.      diclofenac sodium (Voltaren) 1 % gel gel Apply 4.5 inches (4 g) topically 2 times a day as needed.      ferrous sulfate, 325 mg ferrous sulfate, tablet Take 1 tablet (325 mg) by mouth once daily with breakfast.      fluticasone (Flonase) 50 mcg/actuation nasal spray Administer 1 spray into each nostril 2 times a day. Shake gently. Before first use, prime pump. After use, clean tip and replace cap. 48 g 3    HYDROcodone-acetaminophen (Norco) 5-325 mg tablet Take 1 tablet by mouth every 4 hours if needed for moderate pain (4 - 6).      ibuprofen 200 mg tablet Take 1 tablet (200 mg) by mouth 4 times a day.      levothyroxine (Synthroid, Levoxyl) 112 mcg tablet Take 1 tablet (112 mcg) by mouth once daily in the morning. Take before meals. 90 tablet 3    mometasone furoate, bulk, 100 % powder 2 times a day.      ondansetron (Zofran) 4 mg tablet Take 1 tablet (4 mg) by mouth every 8 hours if needed for nausea.      predniSONE (Deltasone) 10 mg tablet TAKE ORALLY: 4 TABLETS DAILY FOR 3 DAYS, 3 TABLETS DAILY FOR 2 DAYS, 2 TABLETS DAILY FOR 2 DAYS AND 1 TABLET DAILY  "FOR 2 DAYS, THEN STOP. 24 tablet 0    sod chloride-sodium bicarb (Neilmed Sinus Rinse Complete) nasal rinse Irrigate your nose per instructions twice daily 1 each 3     No current facility-administered medications on file prior to visit.        Allergies   Allergen Reactions    Codeine Other and Nausea Only     \"I feel like I'm in a fog\"        Review of Systems  A detailed 12 point ROS was performed and is negative except as noted in the intake form, HPI and/or Past Medical History        Physical Exam   CONSTITUTIONAL: Well developed, well nourished.  VOICE: Normal voice quality  RESPIRATION: Breathing comfortably, no stridor.  CV: No clubbing/cyanosis/edema in hands.  EYES: EOM Intact, sclera normal.  NEURO: Alert and oriented times 3, Cranial nerves V,VII intact and symmetric bilaterally.  HEAD AND FACE: Symmetric facial features, no masses or lesions, sinuses nontender to palpation.  SALIVARY GLANDS: Parotid and submandibular glands normal bilaterally.  EARS: Normal external ears, external auditory canals, and TMs to otoscopy  + NOSE: External nose midline, anterior rhinoscopy is normal with limited visualization to the anterior aspect of the interior turbinates. No lesions noted.  ORAL CAVITY/OROPHARYNX/LIPS: Normal mucous membranes, normal floor of mouth/tongue/OP, no masses or lesions are noted.  PHARYNGEAL WALLS AND NASOPHARYNX: No masses noted. Mucosa appears clean and moist  + NECK/LYMPH: No LAD, no thyroid masses. Trachea palpably midline  SKIN: Neck skin is without injury  PSYCH: Alert and oriented with appropriate mood and affect    Nasal endoscopy:  PROCEDURE NOTE    For better visualization because of septal deviation, turbinate hypertrophy nasal endoscopy was performed after verbal consent was obtained by the patient and/or guardian. Both nostrils were sprayed with a mixture of lidocaine 4% and Afrin. After a sufficient amount of time elapsed for mucosal anesthesia to take place, the nasal endoscope " was advanced into the nostril.    The following areas were visualized:  Nasal passage, nasal septum, turbinates, middle meatus, nasopharynx, sinus ostia    The patient tolerated the procedure well and these structures were found to be normal except as follows:  Right:    Patent nasal corridor  Maxillary antrostomy patent  Ethmoid clear   Frontal recess patent     Left:   Patent nasal corridor    Notable findings:  Widely patent nasal corridors.  No mucosal edema.  No mucopurulence, polyps, nor masses seen in inferior meati, middle meati, nor sphenoethmoidal recesses bilaterally.       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

## 2024-09-29 PROBLEM — E66.9 OBESITY: Status: RESOLVED | Noted: 2023-09-27 | Resolved: 2024-09-29

## 2024-09-29 PROBLEM — M67.50 SYNOVIAL PLICA SYNDROME OF KNEE: Status: RESOLVED | Noted: 2024-01-04 | Resolved: 2024-09-29

## 2024-09-29 PROBLEM — R06.02 SHORTNESS OF BREATH: Status: RESOLVED | Noted: 2024-01-04 | Resolved: 2024-09-29

## 2024-09-29 PROBLEM — R22.1 MASS OF NECK: Status: RESOLVED | Noted: 2023-10-26 | Resolved: 2024-09-29

## 2024-09-29 PROBLEM — M54.2 NECK PAIN: Status: RESOLVED | Noted: 2024-01-04 | Resolved: 2024-09-29

## 2024-09-29 PROBLEM — N92.0 MENORRHAGIA: Status: RESOLVED | Noted: 2024-01-04 | Resolved: 2024-09-29

## 2024-09-29 PROBLEM — J01.00 ACUTE MAXILLARY SINUSITIS: Status: RESOLVED | Noted: 2024-01-04 | Resolved: 2024-09-29

## 2024-09-29 PROBLEM — E66.9 OBESITY WITH BODY MASS INDEX 30 OR GREATER: Status: RESOLVED | Noted: 2024-01-04 | Resolved: 2024-09-29

## 2024-09-30 ENCOUNTER — APPOINTMENT (OUTPATIENT)
Dept: PRIMARY CARE | Facility: CLINIC | Age: 59
End: 2024-09-30
Payer: COMMERCIAL

## 2024-09-30 ENCOUNTER — LAB (OUTPATIENT)
Dept: LAB | Facility: LAB | Age: 59
End: 2024-09-30
Payer: COMMERCIAL

## 2024-09-30 VITALS
OXYGEN SATURATION: 96 % | BODY MASS INDEX: 36.22 KG/M2 | HEIGHT: 67 IN | SYSTOLIC BLOOD PRESSURE: 121 MMHG | HEART RATE: 53 BPM | WEIGHT: 230.8 LBS | RESPIRATION RATE: 16 BRPM | DIASTOLIC BLOOD PRESSURE: 68 MMHG | TEMPERATURE: 97.7 F

## 2024-09-30 DIAGNOSIS — Z11.59 NEED FOR HEPATITIS C SCREENING TEST: ICD-10-CM

## 2024-09-30 DIAGNOSIS — Z00.00 ANNUAL PHYSICAL EXAM: ICD-10-CM

## 2024-09-30 DIAGNOSIS — Z13.1 SCREENING FOR DIABETES MELLITUS: ICD-10-CM

## 2024-09-30 DIAGNOSIS — E06.3 HYPOTHYROIDISM DUE TO HASHIMOTO'S THYROIDITIS: ICD-10-CM

## 2024-09-30 DIAGNOSIS — E78.5 HYPERLIPIDEMIA, UNSPECIFIED HYPERLIPIDEMIA TYPE: ICD-10-CM

## 2024-09-30 DIAGNOSIS — Z12.31 SCREENING MAMMOGRAM FOR BREAST CANCER: ICD-10-CM

## 2024-09-30 DIAGNOSIS — E66.812 CLASS 2 OBESITY WITHOUT SERIOUS COMORBIDITY WITH BODY MASS INDEX (BMI) OF 36.0 TO 36.9 IN ADULT, UNSPECIFIED OBESITY TYPE: ICD-10-CM

## 2024-09-30 DIAGNOSIS — Z00.00 ANNUAL PHYSICAL EXAM: Primary | ICD-10-CM

## 2024-09-30 PROBLEM — R73.03 PREDIABETES: Status: ACTIVE | Noted: 2024-09-30

## 2024-09-30 PROBLEM — D50.9 IRON DEFICIENCY ANEMIA: Status: RESOLVED | Noted: 2024-01-04 | Resolved: 2024-09-30

## 2024-09-30 PROBLEM — D75.839 THROMBOCYTHEMIA: Status: RESOLVED | Noted: 2024-01-04 | Resolved: 2024-09-30

## 2024-09-30 PROBLEM — E66.3 OVERWEIGHT: Status: RESOLVED | Noted: 2024-01-04 | Resolved: 2024-09-30

## 2024-09-30 LAB
BASOPHILS # BLD AUTO: 0.05 X10*3/UL (ref 0–0.1)
BASOPHILS NFR BLD AUTO: 0.9 %
EOSINOPHIL # BLD AUTO: 0.34 X10*3/UL (ref 0–0.7)
EOSINOPHIL NFR BLD AUTO: 6.2 %
ERYTHROCYTE [DISTWIDTH] IN BLOOD BY AUTOMATED COUNT: 13.3 % (ref 11.5–14.5)
EST. AVERAGE GLUCOSE BLD GHB EST-MCNC: 123 MG/DL
HBA1C MFR BLD: 5.9 %
HCT VFR BLD AUTO: 42.2 % (ref 36–46)
HGB BLD-MCNC: 13.7 G/DL (ref 12–16)
IMM GRANULOCYTES # BLD AUTO: 0.02 X10*3/UL (ref 0–0.7)
IMM GRANULOCYTES NFR BLD AUTO: 0.4 % (ref 0–0.9)
LYMPHOCYTES # BLD AUTO: 1.73 X10*3/UL (ref 1.2–4.8)
LYMPHOCYTES NFR BLD AUTO: 31.3 %
MCH RBC QN AUTO: 28.7 PG (ref 26–34)
MCHC RBC AUTO-ENTMCNC: 32.5 G/DL (ref 32–36)
MCV RBC AUTO: 89 FL (ref 80–100)
MONOCYTES # BLD AUTO: 0.51 X10*3/UL (ref 0.1–1)
MONOCYTES NFR BLD AUTO: 9.2 %
NEUTROPHILS # BLD AUTO: 2.87 X10*3/UL (ref 1.2–7.7)
NEUTROPHILS NFR BLD AUTO: 52 %
NRBC BLD-RTO: 0 /100 WBCS (ref 0–0)
PLATELET # BLD AUTO: 402 X10*3/UL (ref 150–450)
RBC # BLD AUTO: 4.77 X10*6/UL (ref 4–5.2)
WBC # BLD AUTO: 5.5 X10*3/UL (ref 4.4–11.3)

## 2024-09-30 PROCEDURE — 80053 COMPREHEN METABOLIC PANEL: CPT

## 2024-09-30 PROCEDURE — 36415 COLL VENOUS BLD VENIPUNCTURE: CPT

## 2024-09-30 PROCEDURE — 80061 LIPID PANEL: CPT

## 2024-09-30 PROCEDURE — 84443 ASSAY THYROID STIM HORMONE: CPT

## 2024-09-30 PROCEDURE — 85025 COMPLETE CBC W/AUTO DIFF WBC: CPT

## 2024-09-30 PROCEDURE — 84439 ASSAY OF FREE THYROXINE: CPT

## 2024-09-30 PROCEDURE — 86803 HEPATITIS C AB TEST: CPT

## 2024-09-30 PROCEDURE — 1036F TOBACCO NON-USER: CPT | Performed by: FAMILY MEDICINE

## 2024-09-30 PROCEDURE — 99396 PREV VISIT EST AGE 40-64: CPT | Performed by: FAMILY MEDICINE

## 2024-09-30 PROCEDURE — 83036 HEMOGLOBIN GLYCOSYLATED A1C: CPT

## 2024-09-30 PROCEDURE — 3008F BODY MASS INDEX DOCD: CPT | Performed by: FAMILY MEDICINE

## 2024-09-30 RX ORDER — LEVOTHYROXINE SODIUM 112 UG/1
112 TABLET ORAL
Qty: 90 TABLET | Refills: 3 | Status: SHIPPED | OUTPATIENT
Start: 2024-09-30 | End: 2024-10-01 | Stop reason: SDUPTHER

## 2024-09-30 ASSESSMENT — ENCOUNTER SYMPTOMS
HEADACHES: 0
NAUSEA: 0
PALPITATIONS: 0
COUGH: 0
FEVER: 0
SORE THROAT: 0
NUMBNESS: 0
VOMITING: 0
DIARRHEA: 0
CONSTIPATION: 0
CHILLS: 0
SHORTNESS OF BREATH: 0
MUSCULOSKELETAL NEGATIVE: 1
ABDOMINAL PAIN: 0
PSYCHIATRIC NEGATIVE: 1
DIZZINESS: 0
FATIGUE: 0
WEAKNESS: 0

## 2024-09-30 ASSESSMENT — PATIENT HEALTH QUESTIONNAIRE - PHQ9
2. FEELING DOWN, DEPRESSED OR HOPELESS: NOT AT ALL
1. LITTLE INTEREST OR PLEASURE IN DOING THINGS: NOT AT ALL
SUM OF ALL RESPONSES TO PHQ9 QUESTIONS 1 AND 2: 0

## 2024-09-30 NOTE — PROGRESS NOTES
Subjective   Patient ID: Apryl Vinson is a 59 y.o. female who presents for Annual Exam (Declines the flu shot ).    HPI     The patient presents for her annual wellness exam.  Last pap/cervical cancer screening: 10/20/22 NILM HPV neg   Last mammogram: 10/2023- due   Hx of colonoscopy: 2017 - told good for 10 years   Menopause: 2020  History of depression or anxiety: no   Immunizations: shingrix #2 - declines- had reaction to first shot   Diet: Follows a healthy diet- lots of fruit, veggies, protein; limiting carbs, less sugary drinks   Exercise: Gets some exercise - getting gym membership soon      Hypothyroidism- on levothyroxine 112 mcg daily     HPL- diet controlled     CT calcium score 2   No prior meds     She saw ENT and had sinus surgery in May- Dr. Rubin Ybarra   Reports had reaction to anesthesia - nausea, vomiting   Breathing better now   No more snoring   Occasionally uses [facility] Sinus Rinse     Current Outpatient Medications   Medication Sig Dispense Refill    cholecalciferol (Vitamin D-3) 50 mcg (2,000 unit) capsule Take 1 capsule (50 mcg) by mouth early in the morning..      cyanocobalamin (Vitamin B-12) 1,000 mcg tablet Take 1 tablet (1,000 mcg) by mouth once daily.      diclofenac sodium (Voltaren) 1 % gel gel Apply 4.5 inches (4 g) topically 2 times a day as needed.      ibuprofen 200 mg tablet Take 1 tablet (200 mg) by mouth 4 times a day.      levothyroxine (Synthroid, Levoxyl) 112 mcg tablet Take 1 tablet (112 mcg) by mouth once daily in the morning. Take before meals. 90 tablet 3     No current facility-administered medications for this visit.       Review of Systems   Constitutional:  Negative for chills, fatigue and fever.   HENT:  Negative for congestion, ear pain and sore throat.    Eyes:  Negative for visual disturbance.   Respiratory:  Negative for cough and shortness of breath.    Cardiovascular:  Negative for chest pain, palpitations and leg swelling.   Gastrointestinal:  Negative  "for abdominal pain, constipation, diarrhea, nausea and vomiting.   Genitourinary: Negative.    Musculoskeletal: Negative.    Skin:  Negative for rash.   Neurological:  Negative for dizziness, weakness, numbness and headaches.   Psychiatric/Behavioral: Negative.           Scales reviewed    Patient Health Questionnaire-2 Score: 0 (09/30/24 0838)       Objective   /68 (BP Location: Right arm, Patient Position: Sitting, BP Cuff Size: Adult)   Pulse 53   Temp 36.5 °C (97.7 °F) (Temporal)   Resp 16   Ht 1.702 m (5' 7\")   Wt 105 kg (230 lb 12.8 oz)   SpO2 96%   BMI 36.15 kg/m²     Physical Exam    Constitutional: Well developed, well nourished, alert and in no acute distress.  Head and Face: Normocephalic, atraumatic.  Eyes: Normal external exam. Pupils equally round and reactive to light with normal accommodation and extraocular movements intact.   ENT: External inspection of ears normal, tympanic membranes visualized and normal. Nasal mucosa, septum, and turbinates normal. Oral mucosa moist, oropharynx clear.   Neck: Supple, no lymphadenopathy or masses. Thyroid not enlarged, no palpable nodules.   Cardiovascular: Regular rate and rhythm, normal S1 and S2, no murmurs, gallops, or rubs. Radial pulses normal. No peripheral edema. No carotid bruits.   Pulmonary: No respiratory distress, lungs clear to auscultation bilaterally. No wheezes, rhonchi, rales.   Abdomen: Soft, nontender, nondistended, normal bowel sounds. No masses palpated.   Musculoskeletal: Gait normal. Muscle strength/tone normal of all 4 extremities. Normal range of motion of all extremities.   Skin: Warm, well perfused, normal skin turgor and color, no lesions or rashes noted.   Neurologic: Cranial nerves II-XII grossly intact. Sensation normal bilaterally.   Psychiatric: Mood calm and affect normal.    Assessment/Plan     Problem List Items Addressed This Visit       Hyperlipidemia    Current Assessment & Plan     Diet controlled   10 year " ASCVD risk 2.3%          Relevant Orders    Lipid Panel    Hypothyroidism due to Hashimoto's thyroiditis    Current Assessment & Plan     Continue levothyroxine, check TSH          Relevant Medications    levothyroxine (Synthroid, Levoxyl) 112 mcg tablet    Other Relevant Orders    TSH with reflex to Free T4 if abnormal    Class 2 obesity with body mass index (BMI) of 36.0 to 36.9 in adult     Other Visit Diagnoses       Annual physical exam    -  Primary    Relevant Orders    CBC and Auto Differential    Comprehensive Metabolic Panel    Need for hepatitis C screening test        Relevant Orders    Hepatitis C Antibody    Screening for diabetes mellitus        Relevant Orders    Hemoglobin A1C    Screening mammogram for breast cancer        Relevant Orders    BI mammo bilateral screening tomosynthesis            Follow up in 12 months.      Mackenzie Cote,   9/30/2024

## 2024-10-01 DIAGNOSIS — E06.3 HYPOTHYROIDISM DUE TO HASHIMOTO'S THYROIDITIS: ICD-10-CM

## 2024-10-01 LAB
ALBUMIN SERPL BCP-MCNC: 4.4 G/DL (ref 3.4–5)
ALP SERPL-CCNC: 80 U/L (ref 33–110)
ALT SERPL W P-5'-P-CCNC: 13 U/L (ref 7–45)
ANION GAP SERPL CALC-SCNC: 11 MMOL/L (ref 10–20)
AST SERPL W P-5'-P-CCNC: 16 U/L (ref 9–39)
BILIRUB SERPL-MCNC: 0.4 MG/DL (ref 0–1.2)
BUN SERPL-MCNC: 13 MG/DL (ref 6–23)
CALCIUM SERPL-MCNC: 9.8 MG/DL (ref 8.6–10.6)
CHLORIDE SERPL-SCNC: 103 MMOL/L (ref 98–107)
CHOLEST SERPL-MCNC: 228 MG/DL (ref 0–199)
CHOLESTEROL/HDL RATIO: 3.6
CO2 SERPL-SCNC: 29 MMOL/L (ref 21–32)
CREAT SERPL-MCNC: 0.78 MG/DL (ref 0.5–1.05)
EGFRCR SERPLBLD CKD-EPI 2021: 88 ML/MIN/1.73M*2
GLUCOSE SERPL-MCNC: 88 MG/DL (ref 74–99)
HCV AB SER QL: NONREACTIVE
HDLC SERPL-MCNC: 64 MG/DL
LDLC SERPL CALC-MCNC: 124 MG/DL
NON HDL CHOLESTEROL: 164 MG/DL (ref 0–149)
POTASSIUM SERPL-SCNC: 4.5 MMOL/L (ref 3.5–5.3)
PROT SERPL-MCNC: 7.4 G/DL (ref 6.4–8.2)
SODIUM SERPL-SCNC: 138 MMOL/L (ref 136–145)
T4 FREE SERPL-MCNC: 1.09 NG/DL (ref 0.78–1.48)
TRIGL SERPL-MCNC: 199 MG/DL (ref 0–149)
TSH SERPL-ACNC: 8.63 MIU/L (ref 0.44–3.98)
VLDL: 40 MG/DL (ref 0–40)

## 2024-10-01 RX ORDER — LEVOTHYROXINE SODIUM 125 UG/1
125 TABLET ORAL
Qty: 90 TABLET | Refills: 0 | Status: SHIPPED | OUTPATIENT
Start: 2024-10-01 | End: 2025-10-01

## 2024-10-21 ENCOUNTER — HOSPITAL ENCOUNTER (OUTPATIENT)
Dept: RADIOLOGY | Facility: CLINIC | Age: 59
Discharge: HOME | End: 2024-10-21
Payer: COMMERCIAL

## 2024-10-21 VITALS — BODY MASS INDEX: 36.33 KG/M2 | WEIGHT: 231.48 LBS | HEIGHT: 67 IN

## 2024-10-21 DIAGNOSIS — Z12.31 SCREENING MAMMOGRAM FOR BREAST CANCER: ICD-10-CM

## 2024-10-21 PROCEDURE — 77067 SCR MAMMO BI INCL CAD: CPT | Performed by: RADIOLOGY

## 2024-10-21 PROCEDURE — 77067 SCR MAMMO BI INCL CAD: CPT

## 2024-10-21 PROCEDURE — 77063 BREAST TOMOSYNTHESIS BI: CPT | Performed by: RADIOLOGY

## 2024-12-27 ENCOUNTER — APPOINTMENT (OUTPATIENT)
Dept: PRIMARY CARE | Facility: CLINIC | Age: 59
End: 2024-12-27
Payer: COMMERCIAL

## 2024-12-27 NOTE — PROGRESS NOTES
FAMILY MEDICINE  OFFICE VISIT   Apryl Vinson  [id number]  1965    PCP: Mackenzie Cote DO     Chief Complaint: No chief complaint on file.    SUBJECTIVE     Apryl Vinson is a 59 y.o. ***-speaking female with pertinent PMHx of ***, who presents to the clinic with complaints of ***.    UTI           The following portions of the patient's chart were reviewed in this encounter and updated as appropriate:         Home Medication List:  Current Outpatient Medications   Medication Instructions    cholecalciferol (VITAMIN D-3) 2,000 Units, oral, Daily    cyanocobalamin (VITAMIN B-12) 1,000 mcg, oral, Daily RT    diclofenac sodium (VOLTAREN) 4 g, Topical, 2 times daily PRN    ibuprofen 200 mg, oral, 4 times daily    levothyroxine (SYNTHROID, LEVOXYL) 125 mcg, oral, Daily before breakfast         OBJECTIVE   There were no vitals taken for this visit.  Vital signs and pulse oximetry reviewed.     Physical Exam  ***    ASSESSMENT & PLAN     Problem List Items Addressed This Visit    None      ***    Follow-Up Recommendations: ***    Please excuse any typos or grammatical errors, part of this note was constructed with Dragon dictation software.    Brenda Maya DO, MSEd  [facility]   Office: [phone]  12/27/2024 9:52 AM

## 2025-04-24 DIAGNOSIS — E06.3 HYPOTHYROIDISM DUE TO HASHIMOTO'S THYROIDITIS: ICD-10-CM

## 2025-04-24 RX ORDER — LEVOTHYROXINE SODIUM 125 UG/1
125 TABLET ORAL
Qty: 90 TABLET | Refills: 3 | OUTPATIENT
Start: 2025-04-24 | End: 2026-04-24

## 2025-04-29 DIAGNOSIS — E06.3 HYPOTHYROIDISM DUE TO HASHIMOTO'S THYROIDITIS: ICD-10-CM

## 2025-04-29 RX ORDER — LEVOTHYROXINE SODIUM 125 UG/1
125 TABLET ORAL
Qty: 90 TABLET | Refills: 0 | Status: SHIPPED | OUTPATIENT
Start: 2025-04-29 | End: 2026-04-29

## 2025-04-29 NOTE — TELEPHONE ENCOUNTER
I sent the refill  She needs to get back on the medication and take for 8 weeks prior to checking TSH

## 2025-04-29 NOTE — TELEPHONE ENCOUNTER
Called pt and notified her refill for thyroid med sent to pharmacy and to take for 8 weeks prior to having TSH testing done. Pt thankful and voiced understanding and will get test done. ZOE 4-29-44

## 2025-04-29 NOTE — TELEPHONE ENCOUNTER
Called pt and advised her that she needs to have TSH checked before getting levothyroxine refill. Pt states she has not been taking her med for a while and result may not be accurate, but asking if Dr Cote can renew med and she will take it for a few months and then have the lab test done or she can get the test done now and see what the result is.   MW 4-29-25

## 2025-05-24 ENCOUNTER — OFFICE VISIT (OUTPATIENT)
Dept: URGENT CARE | Age: 60
End: 2025-05-24
Payer: COMMERCIAL

## 2025-05-24 VITALS
TEMPERATURE: 98.2 F | BODY MASS INDEX: 36 KG/M2 | SYSTOLIC BLOOD PRESSURE: 111 MMHG | HEART RATE: 68 BPM | OXYGEN SATURATION: 97 % | HEIGHT: 66 IN | WEIGHT: 224 LBS | DIASTOLIC BLOOD PRESSURE: 75 MMHG | RESPIRATION RATE: 18 BRPM

## 2025-05-24 DIAGNOSIS — R30.0 DYSURIA: ICD-10-CM

## 2025-05-24 LAB
POC APPEARANCE, URINE: CLEAR
POC BILIRUBIN, URINE: NEGATIVE
POC BLOOD, URINE: NEGATIVE
POC COLOR, URINE: YELLOW
POC GLUCOSE, URINE: NEGATIVE MG/DL
POC KETONES, URINE: NEGATIVE MG/DL
POC LEUKOCYTES, URINE: NEGATIVE
POC NITRITE,URINE: NEGATIVE
POC PH, URINE: 6 PH
POC PROTEIN, URINE: NEGATIVE MG/DL
POC SPECIFIC GRAVITY, URINE: 1.01
POC UROBILINOGEN, URINE: 0.2 EU/DL

## 2025-05-24 RX ORDER — NITROFURANTOIN 25; 75 MG/1; MG/1
100 CAPSULE ORAL 2 TIMES DAILY
Qty: 14 CAPSULE | Refills: 0 | Status: SHIPPED | OUTPATIENT
Start: 2025-05-24 | End: 2025-05-31

## 2025-05-24 ASSESSMENT — ENCOUNTER SYMPTOMS
CHILLS: 1
HEADACHES: 0
DIARRHEA: 0
DYSURIA: 1
CONSTIPATION: 0
FEVER: 0
FATIGUE: 1
HEMATURIA: 0
FREQUENCY: 1

## 2025-05-24 NOTE — PROGRESS NOTES
"Subjective   Patient ID: Apryl Vinson is a 60 y.o. female. They present today with a chief complaint of UTI.    History of Present Illness  Patient presents with 2-day history of burning with urination, increased frequency.  States she's post-menopausal and declined pregnancy chance.         UTI  Associated symptoms: fatigue    Associated symptoms: no diarrhea, no fever and no headaches        Past Medical History  Allergies as of 05/24/2025 - Reviewed 05/24/2025   Allergen Reaction Noted    Codeine Other and Nausea Only 02/25/2016       Prescriptions Prior to Admission[1]     Medical History[2]    Surgical History[3]     reports that she has never smoked. She has never been exposed to tobacco smoke. She has never used smokeless tobacco. She reports that she does not currently use alcohol. She reports that she does not use drugs.    Review of Systems  Review of Systems   Constitutional:  Positive for chills and fatigue. Negative for fever.   Gastrointestinal:  Negative for constipation and diarrhea.   Genitourinary:  Positive for dysuria, frequency and urgency. Negative for hematuria and menstrual problem.   Neurological:  Negative for headaches.                                  Objective    Vitals:    05/24/25 0813   BP: 111/75   BP Location: Right arm   Patient Position: Sitting   BP Cuff Size: Large adult   Pulse: 68   Resp: 18   Temp: 36.8 °C (98.2 °F)   TempSrc: Oral   SpO2: 97%   Weight: 102 kg (224 lb)   Height: 1.676 m (5' 6\")     No LMP recorded. Patient is postmenopausal.    Physical Exam  Vitals reviewed.   Constitutional:       Appearance: Normal appearance.   Cardiovascular:      Rate and Rhythm: Normal rate and regular rhythm.   Pulmonary:      Effort: Pulmonary effort is normal.      Breath sounds: Normal breath sounds.   Abdominal:      Tenderness: There is no right CVA tenderness or left CVA tenderness.   Skin:     General: Skin is warm and dry.   Neurological:      Mental Status: She is alert. "         Procedures    Point of Care Test & Imaging Results from this visit  Results for orders placed or performed in visit on 05/24/25   POCT UA Automated manually resulted   Result Value Ref Range    POC Color, Urine Yellow Straw, Yellow, Light-Yellow    POC Appearance, Urine Clear Clear    POC Glucose, Urine NEGATIVE NEGATIVE mg/dl    POC Bilirubin, Urine NEGATIVE NEGATIVE    POC Ketones, Urine NEGATIVE NEGATIVE mg/dl    POC Specific Gravity, Urine 1.010 1.005 - 1.035    POC Blood, Urine NEGATIVE NEGATIVE    POC PH, Urine 6.0 No Reference Range Established PH    POC Protein, Urine NEGATIVE NEGATIVE mg/dl    POC Urobilinogen, Urine 0.2 0.2, 1.0 EU/DL    Poc Nitrite, Urine NEGATIVE NEGATIVE    POC Leukocytes, Urine NEGATIVE NEGATIVE      Imaging  No results found.    Cardiology, Vascular, and Other Imaging  No other imaging results found for the past 2 days      Diagnostic study results (if any) were reviewed by RAQUEL Kay.    Assessment/Plan   Allergies, medications, history, and pertinent labs/EKGs/Imaging reviewed by RAQUEL Kay.     Medical Decision Making  Prescription given for Macrobid. Urine sent for culture. Patient verbalized understanding.     At time of discharge patient was clinically well-appearing and HDS for outpatient management. The patient and/or family was educated regarding diagnosis, supportive care, OTC and Rx medications. The patient and/or family was given the opportunity to ask questions prior to discharge.  They verbalized understanding of my discussion of the plans for treatment, expected course, indications to return to  or seek further evaluation in ED, and the need for timely follow up as directed.   They were provided with a work/school excuse if requested.        Orders and Diagnoses  Diagnoses and all orders for this visit:  Dysuria  -     POCT UA Automated manually resulted  -     nitrofurantoin, macrocrystal-monohydrate, (Macrobid) 100 mg capsule; Take  1 capsule (100 mg) by mouth 2 times a day for 7 days.  -     Urine Culture      Medical Admin Record      Patient disposition: Home    Electronically signed by RAQUEL Kay  8:41 AM           [1] (Not in a hospital admission)   [2]   Past Medical History:  Diagnosis Date    Benign lipomatous neoplasm of skin and subcutaneous tissue of trunk 11/28/2017    Lipoma of back    Encounter for screening for malignant neoplasm of cervix 07/02/2019    Screening for cervical cancer    History of colonoscopy 11/21/2017 11/21/17    History of mammogram 10/25/2024    10/25/24 neg    Hypertrophy of uterus 10/14/2019    Enlarged uterus    Iron deficiency anemia 01/04/2024    Other bursitis of knee, unspecified knee 04/04/2019    Pes anserine bursitis    Other chest pain 07/06/2020    Atypical chest pain    Other shoulder lesions, right shoulder 04/05/2021    Tendinitis of right rotator cuff    Pain in left knee 04/04/2019    Left knee pain    Pain in right shoulder 05/26/2021    Pain in right shoulder    Personal history of diseases of the blood and blood-forming organs and certain disorders involving the immune mechanism 10/14/2019    History of thrombocytosis    Personal history of diseases of the blood and blood-forming organs and certain disorders involving the immune mechanism 06/16/2020    History of iron deficiency anemia    Personal history of other diseases of the female genital tract 06/16/2020    History of menorrhagia    Personal history of other diseases of the respiratory system     History of asthma    Screening for cervical cancer 10/20/2022    10/20/22 NILM HPV neg    Unspecified rotator cuff tear or rupture of right shoulder, not specified as traumatic 04/06/2021    Rotator cuff tear, right    Urinary tract infection, site not specified 12/22/2020    Acute UTI   [3]   Past Surgical History:  Procedure Laterality Date    COLONOSCOPY  11/21/2017    Complete Colonoscopy    OTHER SURGICAL HISTORY   05/04/2016    Open Treat Tib Shaft & Fibular Fracture With Plate / Screws    OTHER SURGICAL HISTORY  10/11/2018    Surgery Excision Lipoma    OTHER SURGICAL HISTORY  04/22/2020    Biopsy Endometrial, Without Cervical Dilation    SINUS SURGERY

## 2025-05-25 ENCOUNTER — TELEPHONE (OUTPATIENT)
Dept: URGENT CARE | Age: 60
End: 2025-05-25

## 2025-05-26 LAB — BACTERIA UR CULT: NORMAL

## 2025-09-22 ENCOUNTER — APPOINTMENT (OUTPATIENT)
Dept: OTOLARYNGOLOGY | Facility: CLINIC | Age: 60
End: 2025-09-22
Payer: COMMERCIAL

## 2025-09-30 ENCOUNTER — APPOINTMENT (OUTPATIENT)
Dept: PRIMARY CARE | Facility: CLINIC | Age: 60
End: 2025-09-30
Payer: COMMERCIAL